# Patient Record
Sex: MALE | Race: WHITE | URBAN - METROPOLITAN AREA
[De-identification: names, ages, dates, MRNs, and addresses within clinical notes are randomized per-mention and may not be internally consistent; named-entity substitution may affect disease eponyms.]

---

## 2016-02-02 LAB — DIABETIC RETINOPATHY: NORMAL

## 2017-03-03 LAB
ALBUMIN SERPL-MCNC: 4.4 G/DL
ALP BLD-CCNC: 62 U/L
ALT SERPL-CCNC: 17 U/L
ANION GAP SERPL CALCULATED.3IONS-SCNC: NORMAL MMOL/L
AST SERPL-CCNC: 19 U/L
BILIRUB SERPL-MCNC: 0.6 MG/DL (ref 0.1–1.4)
BUN BLDV-MCNC: 9 MG/DL
CALCIUM SERPL-MCNC: 9.4 MG/DL
CHLORIDE BLD-SCNC: 103 MMOL/L
CHOLESTEROL, TOTAL: 156 MG/DL
CHOLESTEROL/HDL RATIO: NORMAL
CO2: 26 MMOL/L
CREAT SERPL-MCNC: 1 MG/DL
CREATININE, URINE: 87.9
GFR CALCULATED: NORMAL
GLUCOSE BLD-MCNC: 74 MG/DL
HDLC SERPL-MCNC: 49 MG/DL (ref 35–70)
LDL CHOLESTEROL CALCULATED: 96 MG/DL (ref 0–160)
MICROALBUMIN/CREAT 24H UR: <3 MG/G{CREAT}
MICROALBUMIN/CREAT UR-RTO: <3.4
POTASSIUM SERPL-SCNC: 4.1 MMOL/L
SODIUM BLD-SCNC: 142 MMOL/L
TOTAL PROTEIN: 6.9
TRIGL SERPL-MCNC: 55 MG/DL
VLDLC SERPL CALC-MCNC: NORMAL MG/DL

## 2017-08-02 LAB — T4 FREE: 1.07

## 2017-12-07 DIAGNOSIS — E10.9 TYPE 1 DIABETES MELLITUS WITHOUT COMPLICATION (HCC): ICD-10-CM

## 2017-12-08 ENCOUNTER — OFFICE VISIT (OUTPATIENT)
Dept: ENDOCRINOLOGY | Age: 52
End: 2017-12-08

## 2017-12-08 VITALS
OXYGEN SATURATION: 97 % | HEART RATE: 58 BPM | BODY MASS INDEX: 28.17 KG/M2 | HEIGHT: 71 IN | WEIGHT: 201.2 LBS | DIASTOLIC BLOOD PRESSURE: 82 MMHG | SYSTOLIC BLOOD PRESSURE: 144 MMHG

## 2017-12-08 DIAGNOSIS — Z46.81 INSULIN PUMP TITRATION: ICD-10-CM

## 2017-12-08 DIAGNOSIS — E10.42 CONTROLLED TYPE 1 DIABETES MELLITUS WITH DIABETIC POLYNEUROPATHY (HCC): ICD-10-CM

## 2017-12-08 DIAGNOSIS — Z96.41 INSULIN PUMP IN PLACE: ICD-10-CM

## 2017-12-08 DIAGNOSIS — E10.649 HYPOGLYCEMIC UNAWARENESS ASSOCIATED WITH TYPE 1 DIABETES MELLITUS: Primary | ICD-10-CM

## 2017-12-08 DIAGNOSIS — R79.89 HIGH SERUM THYROID STIMULATING HORMONE (TSH): ICD-10-CM

## 2017-12-08 DIAGNOSIS — E10.69 TYPE 1 DIABETES MELLITUS WITH HYPERCHOLESTEROLEMIA (HCC): ICD-10-CM

## 2017-12-08 DIAGNOSIS — E78.00 TYPE 1 DIABETES MELLITUS WITH HYPERCHOLESTEROLEMIA (HCC): ICD-10-CM

## 2017-12-08 PROCEDURE — 99201 PR OFFICE OUTPATIENT NEW 10 MINUTES: CPT | Performed by: INTERNAL MEDICINE

## 2017-12-08 ASSESSMENT — PATIENT HEALTH QUESTIONNAIRE - PHQ9
1. LITTLE INTEREST OR PLEASURE IN DOING THINGS: 0
SUM OF ALL RESPONSES TO PHQ QUESTIONS 1-9: 0
SUM OF ALL RESPONSES TO PHQ9 QUESTIONS 1 & 2: 0
2. FEELING DOWN, DEPRESSED OR HOPELESS: 0

## 2017-12-08 NOTE — PATIENT INSTRUCTIONS

## 2017-12-08 NOTE — PROGRESS NOTES
11\" (1.803 m)   Wt 201 lb 3.2 oz (91.3 kg)   SpO2 97%   BMI 28.06 kg/m²   Constitutional: Patient is oriented to person, place, and time. Patient appears well-developed and well-nourished. HENT:    Head: Normocephalic and atraumatic. Eyes: Conjunctivae and EOM are normal. Pupils are equal, round, and reactive to light. Neck: Normal range of motion. Cardiovascular: Normal rate, regular rhythm and normal heart sounds. Pulmonary/Chest: Effort normal and breath sounds normal.   Abdominal: Soft. Bowel sounds are normal.   Musculoskeletal: Normal range of motion. Neurological: Patient is alert and oriented to person, place, and time. Patient has normal reflexes. Skin: Skin is warm and dry. Psychiatric: Patient has a normal mood and affect. Patient behavior is normal.     Lab Review:    Abstract on 12/07/2017   Component Date Value Ref Range Status    T4 Free 08/02/2017 1.07   Final    Microalb Creat Ratio 03/03/2017 <3.4   Final    Microalb, Ur 03/03/2017 <3.0   Final    Creatinine, Urine 03/03/2017 87.9   Final    Cholesterol, Total 03/03/2017 156  mg/dL Final    HDL 03/03/2017 49  35 - 70 mg/dL Final    LDL Calculated 03/03/2017 96  0 - 160 mg/dL Final    Triglycerides 03/03/2017 55  mg/dL Final    Sodium 03/03/2017 142  mmol/L Final    Chloride 03/03/2017 103  mmol/L Final    Potassium 03/03/2017 4.1  mmol/L Final    BUN 03/03/2017 9  mg/dL Final    CREATININE 03/03/2017 1.00   Final    Glucose 03/03/2017 74  mg/dL Final    AST 03/03/2017 19  U/L Final    ALT 03/03/2017 17  U/L Final    Calcium 03/03/2017 9.4  mg/dL Final    Total Protein 03/03/2017 6.9   Final    CO2 03/03/2017 26  mmol/L Final    Alb 03/03/2017 4.4   Final    Alkaline Phosphatase 03/03/2017 62  U/L Final    Total Bilirubin 03/03/2017 0.6  0.1 - 1.4 mg/dL Final           Assessment and Plan     Ammon Franco was seen today for diabetes.     Diagnoses and all orders for this visit:    Hypoglycemic unawareness associated with type 1 diabetes mellitus (HCC)  -     Microalbumin / Creatinine Urine Ratio; Future  -     Hemoglobin A1C; Future  -     Comprehensive Metabolic Panel; Future  -     glucose blood VI test strips (ANDREE CONTOUR NEXT TEST) strip; Check blood sugars 8-10 times per day/    Insulin pump in place    Insulin pump titration    Controlled type 1 diabetes mellitus with diabetic polyneuropathy ([facility] [address]  -     Lipid Panel; Future  -     CBC Auto Differential; Future  -     glucose blood VI test strips (ANDREE CONTOUR NEXT TEST) strip; Check blood sugars 8-10 times per day/    Type 1 diabetes mellitus with hypercholesterolemia (HCC)    High serum thyroid stimulating hormone (TSH)  -     T4, Free; Future  -     TSH without Reflex; Future  -     Thyroid Peroxidase Antibody; Future  -     Anti-Thyroglobulin Antibody; Future      1: Type 1 DM complicated with hypoglycemia and neuropathy   Controlled A1C 5.2% July 2017   CMP normal     Pump settings   Rule of 15 for hypoglycemic episodes discussed   I showed how to use temporary basal settings     Keep changing pump site every 3-4 days   Pump downloads are reviewed, discussed with the patient and sent for scanning   Always use bolus wizards    Basal rates: Mn: 0.8--> 0.775  6am 1.25 --> 1.15   Noon: 0.825    Correction 30   I:CHO 1:12   Target 100-150   Active insulin time: 4 hours    All instructions provided in written. Check Blood sugars 3-4 times per day. Log them along with insulin and send them every 2 weeks. Call for blood sugars less than 60 or more than 400. Eye exam: Last exam in spring 2017. Reportedly early glaucoma.    Foot exam: Dec 2017   Deformity/amputation: absent  Skin lesions/pre-ulcerative calluses: absent  Edema: right- negative, left- negative    Sensory exam: Monofilament sensation: normal  Plus at least one of the following:   Pulses: 1 plus   Vibration (128 Hz):slightly decreased bilaterally     Renal screen: Normal March 2017     RD seen

## 2018-03-05 ENCOUNTER — TELEPHONE (OUTPATIENT)
Dept: ENDOCRINOLOGY | Age: 53
End: 2018-03-05

## 2018-03-05 NOTE — TELEPHONE ENCOUNTER
Called [facility] pharmacy back and was left on hold too long. Will try again later.

## 2018-03-05 NOTE — TELEPHONE ENCOUNTER
Called [facility] and spoke with Sam Check the pharmacist. Gave diagnosis code E 11.9

## 2018-03-07 ENCOUNTER — TELEPHONE (OUTPATIENT)
Dept: ENDOCRINOLOGY | Age: 53
End: 2018-03-07

## 2018-03-08 DIAGNOSIS — E10.649 HYPOGLYCEMIC UNAWARENESS ASSOCIATED WITH TYPE 1 DIABETES MELLITUS: ICD-10-CM

## 2018-03-08 DIAGNOSIS — E10.42 CONTROLLED TYPE 1 DIABETES MELLITUS WITH DIABETIC POLYNEUROPATHY (HCC): ICD-10-CM

## 2018-03-08 DIAGNOSIS — R79.89 HIGH SERUM THYROID STIMULATING HORMONE (TSH): ICD-10-CM

## 2018-03-08 LAB
A/G RATIO: 2.3 (ref 1.1–2.2)
ALBUMIN SERPL-MCNC: 4.5 G/DL (ref 3.4–5)
ALP BLD-CCNC: 71 U/L (ref 40–129)
ALT SERPL-CCNC: 11 U/L (ref 10–40)
ANION GAP SERPL CALCULATED.3IONS-SCNC: 13 MMOL/L (ref 3–16)
ANTI-THYROGLOB ABS: <10 IU/ML
AST SERPL-CCNC: 14 U/L (ref 15–37)
BASOPHILS ABSOLUTE: 0 K/UL (ref 0–0.2)
BASOPHILS RELATIVE PERCENT: 0.7 %
BILIRUB SERPL-MCNC: 0.8 MG/DL (ref 0–1)
BUN BLDV-MCNC: 10 MG/DL (ref 7–20)
CALCIUM SERPL-MCNC: 9.1 MG/DL (ref 8.3–10.6)
CHLORIDE BLD-SCNC: 104 MMOL/L (ref 99–110)
CHOLESTEROL, TOTAL: 172 MG/DL (ref 0–199)
CO2: 26 MMOL/L (ref 21–32)
CREAT SERPL-MCNC: 0.9 MG/DL (ref 0.9–1.3)
CREATININE URINE: 169.9 MG/DL (ref 39–259)
EOSINOPHILS ABSOLUTE: 0.1 K/UL (ref 0–0.6)
EOSINOPHILS RELATIVE PERCENT: 2.3 %
GFR AFRICAN AMERICAN: >60
GFR NON-AFRICAN AMERICAN: >60
GLOBULIN: 2 G/DL
GLUCOSE BLD-MCNC: 147 MG/DL (ref 70–99)
HCT VFR BLD CALC: 42 % (ref 40.5–52.5)
HDLC SERPL-MCNC: 46 MG/DL (ref 40–60)
HEMOGLOBIN: 14.1 G/DL (ref 13.5–17.5)
LDL CHOLESTEROL CALCULATED: 112 MG/DL
LYMPHOCYTES ABSOLUTE: 1.2 K/UL (ref 1–5.1)
LYMPHOCYTES RELATIVE PERCENT: 24.2 %
MCH RBC QN AUTO: 29.4 PG (ref 26–34)
MCHC RBC AUTO-ENTMCNC: 33.7 G/DL (ref 31–36)
MCV RBC AUTO: 87.4 FL (ref 80–100)
MICROALBUMIN UR-MCNC: <1.2 MG/DL
MICROALBUMIN/CREAT UR-RTO: NORMAL MG/G (ref 0–30)
MONOCYTES ABSOLUTE: 0.3 K/UL (ref 0–1.3)
MONOCYTES RELATIVE PERCENT: 6.7 %
NEUTROPHILS ABSOLUTE: 3.4 K/UL (ref 1.7–7.7)
NEUTROPHILS RELATIVE PERCENT: 66.1 %
PDW BLD-RTO: 14.4 % (ref 12.4–15.4)
PLATELET # BLD: 266 K/UL (ref 135–450)
PMV BLD AUTO: 9.5 FL (ref 5–10.5)
POTASSIUM SERPL-SCNC: 4.3 MMOL/L (ref 3.5–5.1)
RBC # BLD: 4.81 M/UL (ref 4.2–5.9)
SODIUM BLD-SCNC: 143 MMOL/L (ref 136–145)
T4 FREE: 1.1 NG/DL (ref 0.9–1.8)
THYROID PEROXIDASE (TPO) ABS: 6 IU/ML
TOTAL PROTEIN: 6.5 G/DL (ref 6.4–8.2)
TRIGL SERPL-MCNC: 68 MG/DL (ref 0–150)
TSH SERPL DL<=0.05 MIU/L-ACNC: 3.1 UIU/ML (ref 0.27–4.2)
VLDLC SERPL CALC-MCNC: 14 MG/DL
WBC # BLD: 5.2 K/UL (ref 4–11)

## 2018-03-09 LAB
ESTIMATED AVERAGE GLUCOSE: 142.7 MG/DL
HBA1C MFR BLD: 6.6 %

## 2018-03-14 ENCOUNTER — OFFICE VISIT (OUTPATIENT)
Dept: ENDOCRINOLOGY | Age: 53
End: 2018-03-14

## 2018-03-14 VITALS
OXYGEN SATURATION: 99 % | DIASTOLIC BLOOD PRESSURE: 80 MMHG | BODY MASS INDEX: 26.32 KG/M2 | SYSTOLIC BLOOD PRESSURE: 133 MMHG | WEIGHT: 188 LBS | HEIGHT: 71 IN | HEART RATE: 60 BPM

## 2018-03-14 DIAGNOSIS — E10.42 CONTROLLED TYPE 1 DIABETES MELLITUS WITH DIABETIC POLYNEUROPATHY (HCC): ICD-10-CM

## 2018-03-14 DIAGNOSIS — Z46.81 INSULIN PUMP TITRATION: ICD-10-CM

## 2018-03-14 DIAGNOSIS — E78.00 TYPE 1 DIABETES MELLITUS WITH HYPERCHOLESTEROLEMIA (HCC): Primary | ICD-10-CM

## 2018-03-14 DIAGNOSIS — E10.69 TYPE 1 DIABETES MELLITUS WITH HYPERCHOLESTEROLEMIA (HCC): Primary | ICD-10-CM

## 2018-03-14 DIAGNOSIS — E10.649 HYPOGLYCEMIC UNAWARENESS ASSOCIATED WITH TYPE 1 DIABETES MELLITUS: ICD-10-CM

## 2018-03-14 DIAGNOSIS — Z96.41 INSULIN PUMP IN PLACE: ICD-10-CM

## 2018-03-14 PROCEDURE — 99215 OFFICE O/P EST HI 40 MIN: CPT | Performed by: INTERNAL MEDICINE

## 2018-03-14 RX ORDER — ATORVASTATIN CALCIUM 20 MG/1
20 TABLET, FILM COATED ORAL DAILY
Qty: 90 TABLET | Refills: 1 | Status: SHIPPED | OUTPATIENT
Start: 2018-03-14 | End: 2019-03-08 | Stop reason: SDUPTHER

## 2018-03-14 ASSESSMENT — PATIENT HEALTH QUESTIONNAIRE - PHQ9
2. FEELING DOWN, DEPRESSED OR HOPELESS: 0
SUM OF ALL RESPONSES TO PHQ9 QUESTIONS 1 & 2: 0
1. LITTLE INTEREST OR PLEASURE IN DOING THINGS: 0
SUM OF ALL RESPONSES TO PHQ QUESTIONS 1-9: 0

## 2018-03-14 NOTE — PROGRESS NOTES
Patient ID:   Jenny Sherman is a 45 y.o. male    Chief Complaint:   Jenny Sherman presents for an evaluation of Type 1 Diabetes Mellitus , Hyperlipidemia and hypertension. Here with wife   Subjective:   Type 1 Diabetes Mellitus diagnosed in 1968. Counts carbs, comfortable counting     Recently doing home repair     Medtronic 670G   Uses sensor     Basal: units per hour   Mn: 0.750  6am 1.30   Noon: 0.70    Correction 30   I:CHO MN: 1:12, 5pm: 1:11   Target 100-150   Active insulin time: 4 hours    Checks blood sugars 8-10 times per day. Reviewed and scanned. AM:   Lunch:  Supper:   HS:     Hypoglycemias: 1 ever 2 weeks. Hypoglycemic unawareness: mild awareness in 60's. Glucagon used last week at night. Low blood sugars usually at night. Meals: Three, dinner is bigger. 3-4 snacks. Exercise: 15-20 minutes a day, treadmill walking     Denies chest pain, exertional dyspnea. Family history of CAD: None   Denies smoking/ alcohol. Currently on ASA 81 mg daily     The following portions of the patient's history were reviewed and updated as appropriate:       Family History   Problem Relation Age of Onset   Nael Glaser Breast Cancer Mother     Diabetes Maternal Uncle      type 1    Cancer Paternal Grandmother     Cancer Paternal Grandfather          Social History     Social History    Marital status: Single     Spouse name: N/A    Number of children: N/A    Years of education: N/A     Occupational History    Not on file.      Social History Main Topics    Smoking status: Never Smoker    Smokeless tobacco: Never Used    Alcohol use No    Drug use: No    Sexual activity: Not on file     Other Topics Concern    Not on file     Social History Narrative    No narrative on file       Past Medical History:   Diagnosis Date    Diabetes mellitus ([address]     Insulin pump in place        Past Surgical History:   Procedure Laterality Date    UPPER GASTROINTESTINAL ENDOSCOPY         No Known easily. Psychiatric/Behavioral: Negative for suicidal ideas, depression, anxiety, sleep disturbance and decreased concentration. Objective:   Physical Exam:  /80 (Site: Left Arm, Position: Sitting, Cuff Size: Large Adult)   Pulse 60   Ht 5' 11\" (1.803 m)   Wt 188 lb (85.3 kg)   SpO2 99%   BMI 26.22 kg/m²   Constitutional: Patient is oriented to person, place, and time. Patient appears well-developed and well-nourished. HENT:    Head: Normocephalic and atraumatic. Eyes: Conjunctivae and EOM are normal. Pupils are equal, round, and reactive to light. Neck: Normal range of motion. Cardiovascular: Normal rate, regular rhythm and normal heart sounds. Pulmonary/Chest: Effort normal and breath sounds normal.   Abdominal: Soft. Bowel sounds are normal.   Musculoskeletal: Normal range of motion. Neurological: Patient is alert and oriented to person, place, and time. Patient has normal reflexes. Skin: Skin is warm and dry. Psychiatric: Patient has a normal mood and affect.  Patient behavior is normal.     Lab Review:    Office Visit on 03/14/2018   Component Date Value Ref Range Status    Diabetic Retinopathy 02/02/2016 NO DR   Final   Orders Only on 03/08/2018   Component Date Value Ref Range Status    Microalbumin, Random Urine 03/08/2018 <1.20  <2.0 mg/dL Final    Creatinine, Ur 03/08/2018 169.9  39.0 - 259.0 mg/dL Final    Microalbumin Creatinine Ratio 03/08/2018 see below  0.0 - 30.0 mg/g Final    Hemoglobin A1C 03/08/2018 6.6  See comment % Final    eAG 03/08/2018 142.7  mg/dL Final    Cholesterol, Total 03/08/2018 172  0 - 199 mg/dL Final    Triglycerides 03/08/2018 68  0 - 150 mg/dL Final    HDL 03/08/2018 46  40 - 60 mg/dL Final    LDL Calculated 03/08/2018 112* <100 mg/dL Final    VLDL Cholesterol Calculated 03/08/2018 14  Not Established mg/dL Final    Sodium 03/08/2018 143  136 - 145 mmol/L Final    Potassium 03/08/2018 4.3  3.5 - 5.1 mmol/L Final    Chloride  Anti-Thyroglob Abs 03/08/2018 <10  <115 IU/mL Final   Abstract on 12/07/2017   Component Date Value Ref Range Status    T4 Free 08/02/2017 1.07   Final    Microalbumin Creatinine Ratio 03/03/2017 <3.4   Final    Microalb, Ur 03/03/2017 <3.0   Final    Creatinine, Urine 03/03/2017 87.9   Final    Cholesterol, Total 03/03/2017 156  mg/dL Final    HDL 03/03/2017 49  35 - 70 mg/dL Final    LDL Calculated 03/03/2017 96  0 - 160 mg/dL Final    Triglycerides 03/03/2017 55  mg/dL Final    Sodium 03/03/2017 142  mmol/L Final    Chloride 03/03/2017 103  mmol/L Final    Potassium 03/03/2017 4.1  mmol/L Final    BUN 03/03/2017 9  mg/dL Final    CREATININE 03/03/2017 1.00   Final    Glucose 03/03/2017 74  mg/dL Final    AST 03/03/2017 19  U/L Final    ALT 03/03/2017 17  U/L Final    Calcium 03/03/2017 9.4  mg/dL Final    Total Protein 03/03/2017 6.9   Final    CO2 03/03/2017 26  mmol/L Final    Alb 03/03/2017 4.4   Final    Alkaline Phosphatase 03/03/2017 62  U/L Final    Total Bilirubin 03/03/2017 0.6  0.1 - 1.4 mg/dL Final           Assessment and Plan     Virgiloi Brain was seen today for diabetes. Diagnoses and all orders for this visit:    Type 1 diabetes mellitus with hypercholesterolemia (HCC)  -     insulin glargine (LANTUS SOLOSTAR) 100 UNIT/ML injection pen; Inject 30 Units into the skin nightly In case of pump malfunction  -     glucose blood VI test strips (ANDREE CONTOUR NEXT TEST) strip; 8-9 each by In Vitro route daily As needed. DX CODE: E11.9  -     atorvastatin (LIPITOR) 20 MG tablet; Take 1 tablet by mouth daily  -     Hemoglobin A1C; Future  -     Lipid Panel; Future  -     insulin aspart (NOVOLOG) 100 UNIT/ML injection vial; For insulin pump. Upto 80 units per day.   -     insulin lispro (HUMALOG) 100 UNIT/ML injection vial; Use as directed   Lot number [id number]  EX 9/2019    Hypoglycemic unawareness associated with type 1 diabetes mellitus ([facility]    Controlled type 1 diabetes mellitus with diabetic polyneuropathy (HCC)  -     insulin glargine (LANTUS SOLOSTAR) 100 UNIT/ML injection pen; Inject 30 Units into the skin nightly In case of pump malfunction  -     glucose blood VI test strips (ANDREE CONTOUR NEXT TEST) strip; 8-9 each by In Vitro route daily As needed. DX CODE: E11.9  -     insulin lispro (HUMALOG) 100 UNIT/ML injection vial; Use as directed   Lot number [id number]  EX 9/2019    Insulin pump in place    Insulin pump titration      1: Type 1 DM complicated with hypoglycemia and neuropathy   Controlled A1C 6.6% Dec 2017   CMP normal       Hypoglycemias during the day are due to activity   Asked him to use temp basals     Pump settings   Rule of 15 for hypoglycemic episodes discussed   I showed how to use temporary basal settings     Keep changing pump site every 3-4 days   Pump downloads are reviewed, discussed with the patient and sent for scanning   Always use bolus wizards    Basal rates: Mn: 0.750  6am 1.30   Noon: 0.70    Correction 30   I:CHO MN: 1:10    Target 100-150   Active insulin time: 4 hours    All instructions provided in written. Check Blood sugars 3-4 times per day. Log them along with insulin and send them every 2 weeks. Call for blood sugars less than 60 or more than 400. Eye exam: Last exam in spring 2017. Reportedly early glaucoma. Due for exam.   Foot exam: Dec 2017   Deformity/amputation: absent  Skin lesions/pre-ulcerative calluses: absent  Edema: right- negative, left- negative    Sensory exam: Monofilament sensation: normal  Plus at least one of the following:   Pulses: 1 plus   Vibration (128 Hz):slightly decreased bilaterally     Renal screen: Normal March 2018    RD seen in 2017.    TSH screen: normal with negative abs March 2018     2: HTN   Controlled     3: Hyperlipidemia   LDL: 112 , HDL: 46 , TGs: 46 March 2018    Statins indicated   Atorvastatin 20mg daily at bedtime   Side effects discussed including muscle cramps     4: Weight loss  13 lbs in last 3 months and 20 lbs since Aug 2017    Recommend seeing pcp     A1C, lipids in 3 months       The total time spent face to face for this encounter was 45 minutes. Greater than 50% of the time was spent in counseling and coordination of care, treatment goals, benefits and risks of the treatment for diabetes, HTN, insulin pump, benefits of statins. EDUCATION:   Greater than 50% of this follow-up visit was spent in general counseling regarding   obesity, diet, exercise, importance of adherence to insulin regime, recognition and treatment of hypo and hyperglycemia,  glucose logging, proper diabetes management, diabetic complications with poor management and the importance of glycemic control in order to avoid the complications of diabetes. Risks and potential complications of diabetes were reviewed with the patient. Diabetes health maintenance plan and follow-up were discussed and understood by the patient. We reviewed the importance of medication compliance and regular follow-up. Aggressive lifestyle modification was encouraged. Exercise Counselling: This patient is a candidate for regular physical exercise. Instructions to perform the following types of exercise:  Swimming or water aerobic exercise  Brisk walking  Playing tennis  Stationary bicycle or elliptical indoor  Low impact aerobic exercise    Instructions given to exercise for the following duration:  30 minutes a day for five-seven days per week.     Following instructions for being active throughout the day in addition to formal exercise:  Walk instead of drive whenever possible  Take the stairs instead of the elevator  Work in the garden  Park to the far end of the parking lot to add more walking steps to destination      Electronically signed by Tim Zaman MD on 3/15/2018 at 10:03 AM

## 2018-03-21 ENCOUNTER — TELEPHONE (OUTPATIENT)
Dept: ENDOCRINOLOGY | Age: 53
End: 2018-03-21

## 2018-03-21 DIAGNOSIS — E10.42 CONTROLLED TYPE 1 DIABETES MELLITUS WITH DIABETIC POLYNEUROPATHY (HCC): Primary | ICD-10-CM

## 2018-03-21 NOTE — TELEPHONE ENCOUNTER
PA submitted for Lantus soslstar in the even of pump malfunction as been denied as the request did not meet plan criteria for an exception to the preferred drug on the formulary such as Toujeo or Winda Cure. Please submit RX for one of the alternatives noted.

## 2018-03-22 ENCOUNTER — TELEPHONE (OUTPATIENT)
Dept: ENDOCRINOLOGY | Age: 53
End: 2018-03-22

## 2018-03-30 LAB — DIABETIC RETINOPATHY: NORMAL

## 2018-04-18 ENCOUNTER — TELEPHONE (OUTPATIENT)
Dept: ENDOCRINOLOGY | Age: 53
End: 2018-04-18

## 2018-04-19 ENCOUNTER — TELEPHONE (OUTPATIENT)
Dept: ENDOCRINOLOGY | Age: 53
End: 2018-04-19

## 2018-05-07 ENCOUNTER — TELEPHONE (OUTPATIENT)
Dept: ENDOCRINOLOGY | Age: 53
End: 2018-05-07

## 2018-05-07 DIAGNOSIS — E10.42 TYPE 1 DIABETES MELLITUS WITH POLYNEUROPATHY (HCC): Primary | ICD-10-CM

## 2018-05-07 RX ORDER — BLOOD-GLUCOSE METER
1 KIT MISCELLANEOUS DAILY PRN
Qty: 1 DEVICE | Refills: 1 | Status: SHIPPED | OUTPATIENT
Start: 2018-05-07 | End: 2018-11-28 | Stop reason: ALTCHOICE

## 2018-07-27 DIAGNOSIS — E78.00 TYPE 1 DIABETES MELLITUS WITH HYPERCHOLESTEROLEMIA (HCC): ICD-10-CM

## 2018-07-27 DIAGNOSIS — E10.69 TYPE 1 DIABETES MELLITUS WITH HYPERCHOLESTEROLEMIA (HCC): ICD-10-CM

## 2018-07-27 LAB
CHOLESTEROL, TOTAL: 153 MG/DL (ref 0–199)
HDLC SERPL-MCNC: 52 MG/DL (ref 40–60)
LDL CHOLESTEROL CALCULATED: 93 MG/DL
TRIGL SERPL-MCNC: 41 MG/DL (ref 0–150)
VLDLC SERPL CALC-MCNC: 8 MG/DL

## 2018-07-28 LAB
ESTIMATED AVERAGE GLUCOSE: 131.2 MG/DL
HBA1C MFR BLD: 6.2 %

## 2018-08-03 ENCOUNTER — OFFICE VISIT (OUTPATIENT)
Dept: ENDOCRINOLOGY | Age: 53
End: 2018-08-03

## 2018-08-03 VITALS
SYSTOLIC BLOOD PRESSURE: 142 MMHG | DIASTOLIC BLOOD PRESSURE: 77 MMHG | HEART RATE: 53 BPM | HEIGHT: 71 IN | WEIGHT: 183 LBS | BODY MASS INDEX: 25.62 KG/M2 | OXYGEN SATURATION: 98 %

## 2018-08-03 DIAGNOSIS — E10.649 HYPOGLYCEMIC UNAWARENESS ASSOCIATED WITH TYPE 1 DIABETES MELLITUS: ICD-10-CM

## 2018-08-03 DIAGNOSIS — Z96.41 INSULIN PUMP IN PLACE: ICD-10-CM

## 2018-08-03 DIAGNOSIS — E10.3299 TYPE 1 DIABETES MELLITUS WITH BACKGROUND DIABETIC RETINOPATHY (HCC): ICD-10-CM

## 2018-08-03 DIAGNOSIS — E10.69 TYPE 1 DIABETES MELLITUS WITH HYPERCHOLESTEROLEMIA (HCC): Primary | ICD-10-CM

## 2018-08-03 DIAGNOSIS — E78.00 TYPE 1 DIABETES MELLITUS WITH HYPERCHOLESTEROLEMIA (HCC): Primary | ICD-10-CM

## 2018-08-03 DIAGNOSIS — E10.42 CONTROLLED TYPE 1 DIABETES MELLITUS WITH DIABETIC POLYNEUROPATHY (HCC): ICD-10-CM

## 2018-08-03 DIAGNOSIS — Z46.81 INSULIN PUMP TITRATION: ICD-10-CM

## 2018-08-03 PROCEDURE — 99215 OFFICE O/P EST HI 40 MIN: CPT | Performed by: INTERNAL MEDICINE

## 2018-08-03 ASSESSMENT — PATIENT HEALTH QUESTIONNAIRE - PHQ9
SUM OF ALL RESPONSES TO PHQ9 QUESTIONS 1 & 2: 0
2. FEELING DOWN, DEPRESSED OR HOPELESS: 0
1. LITTLE INTEREST OR PLEASURE IN DOING THINGS: 0
SUM OF ALL RESPONSES TO PHQ QUESTIONS 1-9: 0

## 2018-08-03 NOTE — PROGRESS NOTES
Patient ID:   Sid Leonardo is a 45 y.o. male    Chief Complaint:   Sid Leonardo presents for an evaluation of Type 1 Diabetes Mellitus , Hyperlipidemia and hypertension. Here with wife   Subjective:   Type 1 Diabetes Mellitus diagnosed in 1968. Counts carbs, comfortable counting       Medtronic 670G   Uses sensor     Basal: units per hour   Mn: 0.750  6am 1.30   Noon: 0.70    Correction 30   I:CHO MN: 1:10  Target    Active insulin time: 4 hours    Checks blood sugars 8-10 times per day. Reviewed and scanned. AM:   Lunch:  Supper:   HS:     Hypoglycemias: In the evenings, one a week numbers less than 40. Once had to use Glucagon in June 2018. Hypoglycemic unawareness: mild awareness in 60's. Glucagon used last week at night. Low blood sugars usually at night. Meals: Three, dinner is bigger. 3-4 snacks. Exercise: No treadmill. 5-20 minutes a day, treadmill walking     Denies chest pain, exertional dyspnea. Family history of CAD: None   Denies smoking/ alcohol. Currently on ASA 81 mg daily     The following portions of the patient's history were reviewed and updated as appropriate:       Family History   Problem Relation Age of Onset   Adrián Garcia Breast Cancer Mother     Diabetes Maternal Uncle         type 1    Cancer Paternal Grandmother     Cancer Paternal Grandfather          Social History     Social History    Marital status: Single     Spouse name: N/A    Number of children: N/A    Years of education: N/A     Occupational History    Not on file.      Social History Main Topics    Smoking status: Never Smoker    Smokeless tobacco: Never Used    Alcohol use No    Drug use: No    Sexual activity: Not on file     Other Topics Concern    Not on file     Social History Narrative    No narrative on file       Past Medical History:   Diagnosis Date    Diabetes mellitus ([address]     Insulin pump in place        Past Surgical History:   Procedure Laterality Date    UPPER Large Adult)   Pulse 53   Ht 5' 11\" (1.803 m)   Wt 183 lb (83 kg)   SpO2 98%   BMI 25.52 kg/m²   Constitutional: Patient is oriented to person, place, and time. Patient appears well-developed and well-nourished. HENT:    Head: Normocephalic and atraumatic. Eyes: Conjunctivae and EOM are normal. Pupils are equal, round, and reactive to light. Neck: Normal range of motion. Cardiovascular: Normal rate, regular rhythm and normal heart sounds. Pulmonary/Chest: Effort normal and breath sounds normal.   Abdominal: Soft. Bowel sounds are normal.   Musculoskeletal: Normal range of motion. Neurological: Patient is alert and oriented to person, place, and time. Patient has normal reflexes. Skin: Skin is warm and dry. Psychiatric: Patient has a normal mood and affect.  Patient behavior is normal.     Lab Review:    Orders Only on 07/27/2018   Component Date Value Ref Range Status    Hemoglobin A1C 07/27/2018 6.2  See comment % Final    eAG 07/27/2018 131.2  mg/dL Final    Cholesterol, Total 07/27/2018 153  0 - 199 mg/dL Final    Triglycerides 07/27/2018 41  0 - 150 mg/dL Final    HDL 07/27/2018 52  40 - 60 mg/dL Final    LDL Calculated 07/27/2018 93  <100 mg/dL Final    VLDL Cholesterol Calculated 07/27/2018 8  Not Established mg/dL Final   Office Visit on 03/14/2018   Component Date Value Ref Range Status    Diabetic Retinopathy 02/02/2016 NO DR   Final   Orders Only on 03/08/2018   Component Date Value Ref Range Status    Microalbumin, Random Urine 03/08/2018 <1.20  <2.0 mg/dL Final    Creatinine, Ur 03/08/2018 169.9  39.0 - 259.0 mg/dL Final    Microalbumin Creatinine Ratio 03/08/2018 see below  0.0 - 30.0 mg/g Final    Hemoglobin A1C 03/08/2018 6.6  See comment % Final    eAG 03/08/2018 142.7  mg/dL Final    Cholesterol, Total 03/08/2018 172  0 - 199 mg/dL Final    Triglycerides 03/08/2018 68  0 - 150 mg/dL Final    HDL 03/08/2018 46  40 - 60 mg/dL Final    LDL Calculated 03/08/2018 Basophils # 03/08/2018 0.0  0.0 - 0.2 K/uL Final    T4 Free 03/08/2018 1.1  0.9 - 1.8 ng/dL Final    TSH 03/08/2018 3.10  0.27 - 4.20 uIU/mL Final    THYROID PEROXIDASE (TPO) ABS 03/08/2018 6  <34 IU/mL Final    Anti-Thyroglob Abs 03/08/2018 <10  <115 IU/mL Final   Abstract on 12/07/2017   Component Date Value Ref Range Status    T4 Free 08/02/2017 1.07   Final    Microalbumin Creatinine Ratio 03/03/2017 <3.4   Final    Microalb, Ur 03/03/2017 <3.0   Final    Creatinine, Urine 03/03/2017 87.9   Final    Cholesterol, Total 03/03/2017 156  mg/dL Final    HDL 03/03/2017 49  35 - 70 mg/dL Final    LDL Calculated 03/03/2017 96  0 - 160 mg/dL Final    Triglycerides 03/03/2017 55  mg/dL Final    Sodium 03/03/2017 142  mmol/L Final    Chloride 03/03/2017 103  mmol/L Final    Potassium 03/03/2017 4.1  mmol/L Final    BUN 03/03/2017 9  mg/dL Final    CREATININE 03/03/2017 1.00   Final    Glucose 03/03/2017 74  mg/dL Final    AST 03/03/2017 19  U/L Final    ALT 03/03/2017 17  U/L Final    Calcium 03/03/2017 9.4  mg/dL Final    Total Protein 03/03/2017 6.9   Final    CO2 03/03/2017 26  mmol/L Final    Alb 03/03/2017 4.4   Final    Alkaline Phosphatase 03/03/2017 62  U/L Final    Total Bilirubin 03/03/2017 0.6  0.1 - 1.4 mg/dL Final           Assessment and Plan     Trudie Habermann was seen today for diabetes. Diagnoses and all orders for this visit:    Type 1 diabetes mellitus with hypercholesterolemia (HCC)  -     glucagon (GLUCAGON EMERGENCY) 1 MG injection; Infuse 1 mg intravenously once for 1 dose  -     Hemoglobin A1C; Future    Hypoglycemic unawareness associated with type 1 diabetes mellitus (HCC)  -     glucagon (GLUCAGON EMERGENCY) 1 MG injection;  Infuse 1 mg intravenously once for 1 dose  -     Hemoglobin A1C; Future    Controlled type 1 diabetes mellitus with diabetic polyneuropathy (HCC)  -     Hemoglobin A1C; Future    Insulin pump titration    Insulin pump in place    Type 1 diabetes mellitus of adherence to insulin regime, recognition and treatment of hypo and hyperglycemia,  glucose logging, proper diabetes management, diabetic complications with poor management and the importance of glycemic control in order to avoid the complications of diabetes. Risks and potential complications of diabetes were reviewed with the patient. Diabetes health maintenance plan and follow-up were discussed and understood by the patient. We reviewed the importance of medication compliance and regular follow-up. Aggressive lifestyle modification was encouraged. Exercise Counselling: This patient is a candidate for regular physical exercise. Instructions to perform the following types of exercise:  Swimming or water aerobic exercise  Brisk walking  Playing tennis  Stationary bicycle or elliptical indoor  Low impact aerobic exercise    Instructions given to exercise for the following duration:  30 minutes a day for five-seven days per week.     Following instructions for being active throughout the day in addition to formal exercise:  Walk instead of drive whenever possible  Take the stairs instead of the elevator  Work in the garden  Park to the far end of the parking lot to add more walking steps to destination      Electronically signed by Archie Gutierres MD on 8/3/2018 at 12:34 PM

## 2018-11-27 DIAGNOSIS — E10.42 TYPE 1 DIABETES MELLITUS WITH POLYNEUROPATHY (HCC): ICD-10-CM

## 2018-11-28 ENCOUNTER — OFFICE VISIT (OUTPATIENT)
Dept: ENDOCRINOLOGY | Age: 53
End: 2018-11-28
Payer: COMMERCIAL

## 2018-11-28 VITALS
WEIGHT: 187 LBS | SYSTOLIC BLOOD PRESSURE: 134 MMHG | BODY MASS INDEX: 26.18 KG/M2 | HEART RATE: 50 BPM | DIASTOLIC BLOOD PRESSURE: 78 MMHG | OXYGEN SATURATION: 100 % | HEIGHT: 71 IN

## 2018-11-28 DIAGNOSIS — E10.42 CONTROLLED TYPE 1 DIABETES MELLITUS WITH DIABETIC POLYNEUROPATHY (HCC): ICD-10-CM

## 2018-11-28 DIAGNOSIS — E78.00 TYPE 1 DIABETES MELLITUS WITH HYPERCHOLESTEROLEMIA (HCC): ICD-10-CM

## 2018-11-28 DIAGNOSIS — Z96.41 INSULIN PUMP IN PLACE: ICD-10-CM

## 2018-11-28 DIAGNOSIS — E10.649 HYPOGLYCEMIC UNAWARENESS ASSOCIATED WITH TYPE 1 DIABETES MELLITUS: ICD-10-CM

## 2018-11-28 DIAGNOSIS — E10.3299 TYPE 1 DIABETES MELLITUS WITH BACKGROUND DIABETIC RETINOPATHY (HCC): ICD-10-CM

## 2018-11-28 DIAGNOSIS — E10.69 TYPE 1 DIABETES MELLITUS WITH HYPERCHOLESTEROLEMIA (HCC): ICD-10-CM

## 2018-11-28 DIAGNOSIS — E10.42 CONTROLLED TYPE 1 DIABETES MELLITUS WITH DIABETIC POLYNEUROPATHY (HCC): Primary | ICD-10-CM

## 2018-11-28 DIAGNOSIS — Z46.81 INSULIN PUMP TITRATION: ICD-10-CM

## 2018-11-28 PROCEDURE — 99215 OFFICE O/P EST HI 40 MIN: CPT | Performed by: INTERNAL MEDICINE

## 2018-11-28 PROCEDURE — 3044F HG A1C LEVEL LT 7.0%: CPT | Performed by: INTERNAL MEDICINE

## 2018-11-28 PROCEDURE — G8419 CALC BMI OUT NRM PARAM NOF/U: HCPCS | Performed by: INTERNAL MEDICINE

## 2018-11-28 PROCEDURE — 2022F DILAT RTA XM EVC RTNOPTHY: CPT | Performed by: INTERNAL MEDICINE

## 2018-11-28 PROCEDURE — 3017F COLORECTAL CA SCREEN DOC REV: CPT | Performed by: INTERNAL MEDICINE

## 2018-11-28 PROCEDURE — 1036F TOBACCO NON-USER: CPT | Performed by: INTERNAL MEDICINE

## 2018-11-28 PROCEDURE — G8427 DOCREV CUR MEDS BY ELIG CLIN: HCPCS | Performed by: INTERNAL MEDICINE

## 2018-11-28 PROCEDURE — G8484 FLU IMMUNIZE NO ADMIN: HCPCS | Performed by: INTERNAL MEDICINE

## 2018-11-28 NOTE — PROGRESS NOTES
Patient ID:   Richar Vu is a 52 y.o. male    Chief Complaint:   Richar Vu presents for an evaluation of Type 1 Diabetes Mellitus , Hyperlipidemia and hypertension. Here with wife   Subjective:   Type 1 Diabetes Mellitus diagnosed in 1968. Counts carbs, comfortable counting     Medtronic 670G   Uses sensor     Basal: units per hour   Workday (active)   Mn: 1.00  6am: 1.15  Noon: 1.10   Noon: 0.90    Basal 1:  MN : 0.875  6am: 1.15  Noon 0.825    Correction 30   I:CHO MN: 1:11, 4pm: 1:12   Target    Active insulin time: 4 hours    Checks blood sugars 8-10 times per day. Reviewed and scanned. AM:   Lunch:  Supper:   HS:     Hypoglycemias: In the evenings, one a week numbers less than 40. Once had to use Glucagon in June 2018. Hypoglycemic unawareness: mild awareness in 60's. Glucagon used last week at night. Low blood sugars usually at night. Meals: Three, dinner is bigger. 3-4 snacks. Exercise: No treadmill. 5-20 minutes a day, treadmill walking     Denies chest pain, exertional dyspnea. Family history of CAD: None   Denies smoking/ alcohol. Currently on ASA 81 mg daily     The following portions of the patient's history were reviewed and updated as appropriate:       Family History   Problem Relation Age of Onset   Fernandez Backer Breast Cancer Mother     Diabetes Maternal Uncle         type 1    Cancer Paternal Grandmother     Cancer Paternal Grandfather          Social History     Social History    Marital status: Single     Spouse name: N/A    Number of children: N/A    Years of education: N/A     Occupational History    Not on file.      Social History Main Topics    Smoking status: Never Smoker    Smokeless tobacco: Never Used    Alcohol use No    Drug use: No    Sexual activity: Not on file     Other Topics Concern    Not on file     Social History Narrative    No narrative on file       Past Medical History:   Diagnosis Date    Diabetes mellitus ([address]     Insulin pump in place        Past Surgical History:   Procedure Laterality Date    UPPER GASTROINTESTINAL ENDOSCOPY         No Known Allergies      Current Outpatient Prescriptions:     atorvastatin (LIPITOR) 20 MG tablet, Take 1 tablet by mouth daily, Disp: 90 tablet, Rfl: 1    insulin aspart (NOVOLOG) 100 UNIT/ML injection vial, For insulin pump. Upto 80 units per day., Disp: 3 vial, Rfl: 5    aspirin 81 MG tablet, Take 81 mg by mouth daily, Disp: , Rfl:     glucagon (GLUCAGON EMERGENCY) 1 MG injection, Infuse 1 mg intravenously once for 1 dose, Disp: 1 kit, Rfl: 11      Review of Systems:    Constitutional: Negative for fever, chills, and unexpected weight change. HENT: Negative for congestion, ear pain, rhinorrhea,  sore throat and trouble swallowing. Eyes: Negative for photophobia, redness, itching. Respiratory: Negative for cough, shortness of breath and sputum. Cardiovascular: Negative for chest pain, palpitations and leg swelling. Gastrointestinal: Negative for nausea, vomiting, abdominal pain, diarrhea, constipation. Endocrine: Negative for cold intolerance, heat intolerance, polydipsia, polyphagia and polyuria. Genitourinary: Negative for dysuria, urgency, frequency, hematuria and flank pain. Musculoskeletal: Negative for myalgias, back pain, arthralgias and neck pain. Skin/Nail: Negative for rash, itching. Normal nails. Neurological: Negative for seizures, weakness, light-headedness, numbness and headaches. Hematological/ Lymph nodes: Negative for adenopathy. Does not bruise/bleed easily. Psychiatric/Behavioral: Negative for suicidal ideas, depression, anxiety, sleep disturbance and decreased concentration. Objective:   Physical Exam:  /78 (Site: Left Upper Arm, Position: Sitting, Cuff Size: Large Adult)   Pulse 50   Ht 5' 11\" (1.803 m)   Wt 187 lb (84.8 kg)   SpO2 100%   BMI 26.08 kg/m²   Constitutional: Patient is oriented to person, place, and time.  Patient -- > 4.5 hours     All instructions provided in written. Check Blood sugars 3-4 times per day. Log them along with insulin and send them every 2 weeks. Call for blood sugars less than 60 or more than 400. Eye exam: April 2018, early left retinopathy   Foot exam: Nov 2018    Deformity/amputation: absent  Skin lesions/pre-ulcerative calluses: absent  Edema: right- negative, left- negative    Sensory exam: Monofilament sensation: normal  Plus at least one of the following:   Pulses: 1 plus   Vibration (128 Hz):slightly decreased bilaterally     Renal screen: Normal March 2018    RD seen in 2017. TSH screen: normal with negative abs March 2018     2: HTN   Controlled     3: Hyperlipidemia   LDL: 93 , HDL: 52 , TGs: 41 July 2018    Atorvastatin 20mg daily at bedtime     A1C pending from today     RTC in 3 months with MACR, A1C     The total time spent face to face for this encounter was 45 minutes. Greater than 50% of the time was spent in counseling and coordination of care, treatment goals, benefits and risks of the treatment for diabetes, HTN, insulin pump, benefits of statins. EDUCATION:   Greater than 50% of this follow-up visit was spent in general counseling regarding   obesity, diet, exercise, importance of adherence to insulin regime, recognition and treatment of hypo and hyperglycemia,  glucose logging, proper diabetes management, diabetic complications with poor management and the importance of glycemic control in order to avoid the complications of diabetes. Risks and potential complications of diabetes were reviewed with the patient. Diabetes health maintenance plan and follow-up were discussed and understood by the patient. We reviewed the importance of medication compliance and regular follow-up. Aggressive lifestyle modification was encouraged. Exercise Counselling: This patient is a candidate for regular physical exercise.  Instructions to perform the following types of

## 2018-11-28 NOTE — PATIENT INSTRUCTIONS
Patient Education        Learning About Diabetes Food Guidelines  Your Care Instructions    Meal planning is important to manage diabetes. It helps keep your blood sugar at a target level (which you set with your doctor). You don't have to eat special foods. You can eat what your family eats, including sweets once in a while. But you do have to pay attention to how often you eat and how much you eat of certain foods. You may want to work with a dietitian or a certified diabetes educator (CDE) to help you plan meals and snacks. A dietitian or CDE can also help you lose weight if that is one of your goals. What should you know about eating carbs? Managing the amount of carbohydrate (carbs) you eat is an important part of healthy meals when you have diabetes. Carbohydrate is found in many foods. · Learn which foods have carbs. And learn the amounts of carbs in different foods. ? Bread, cereal, pasta, and rice have about 15 grams of carbs in a serving. A serving is 1 slice of bread (1 ounce), ½ cup of cooked cereal, or 1/3 cup of cooked pasta or rice. ? Fruits have 15 grams of carbs in a serving. A serving is 1 small fresh fruit, such as an apple or orange; ½ of a banana; ½ cup of cooked or canned fruit; ½ cup of fruit juice; 1 cup of melon or raspberries; or 2 tablespoons of dried fruit. ? Milk and no-sugar-added yogurt have 15 grams of carbs in a serving. A serving is 1 cup of milk or 2/3 cup of no-sugar-added yogurt. ? Starchy vegetables have 15 grams of carbs in a serving. A serving is ½ cup of mashed potatoes or sweet potato; 1 cup winter squash; ½ of a small baked potato; ½ cup of cooked beans; or ½ cup cooked corn or green peas. · Learn how much carbs to eat each day and at each meal. A dietitian or CDE can teach you how to keep track of the amount of carbs you eat. This is called carbohydrate counting. · If you are not sure how to count carbohydrate grams, use the Plate Method to plan meals.  It is a

## 2018-11-29 ENCOUNTER — TELEPHONE (OUTPATIENT)
Dept: ENDOCRINOLOGY | Age: 53
End: 2018-11-29

## 2018-11-29 LAB
ESTIMATED AVERAGE GLUCOSE: 111.2 MG/DL
HBA1C MFR BLD: 5.5 %

## 2018-11-29 NOTE — TELEPHONE ENCOUNTER
Pt was returning April's call and he was informed of the results. Pt was surprised that his A1C was that low but he has already started taking steps to improve that.

## 2018-12-31 DIAGNOSIS — E78.00 TYPE 1 DIABETES MELLITUS WITH HYPERCHOLESTEROLEMIA (HCC): ICD-10-CM

## 2018-12-31 DIAGNOSIS — E10.69 TYPE 1 DIABETES MELLITUS WITH HYPERCHOLESTEROLEMIA (HCC): ICD-10-CM

## 2019-03-08 ENCOUNTER — OFFICE VISIT (OUTPATIENT)
Dept: ENDOCRINOLOGY | Age: 54
End: 2019-03-08
Payer: COMMERCIAL

## 2019-03-08 VITALS
DIASTOLIC BLOOD PRESSURE: 79 MMHG | OXYGEN SATURATION: 100 % | SYSTOLIC BLOOD PRESSURE: 131 MMHG | WEIGHT: 191 LBS | HEART RATE: 56 BPM | BODY MASS INDEX: 26.74 KG/M2 | HEIGHT: 71 IN

## 2019-03-08 DIAGNOSIS — Z96.41 INSULIN PUMP IN PLACE: ICD-10-CM

## 2019-03-08 DIAGNOSIS — E10.3299 TYPE 1 DIABETES MELLITUS WITH BACKGROUND DIABETIC RETINOPATHY (HCC): ICD-10-CM

## 2019-03-08 DIAGNOSIS — E10.69 TYPE 1 DIABETES MELLITUS WITH HYPERCHOLESTEROLEMIA (HCC): ICD-10-CM

## 2019-03-08 DIAGNOSIS — E78.00 TYPE 1 DIABETES MELLITUS WITH HYPERCHOLESTEROLEMIA (HCC): ICD-10-CM

## 2019-03-08 DIAGNOSIS — E10.649 HYPOGLYCEMIC UNAWARENESS ASSOCIATED WITH TYPE 1 DIABETES MELLITUS: ICD-10-CM

## 2019-03-08 DIAGNOSIS — E10.42 CONTROLLED TYPE 1 DIABETES MELLITUS WITH DIABETIC POLYNEUROPATHY (HCC): ICD-10-CM

## 2019-03-08 DIAGNOSIS — E10.42 CONTROLLED TYPE 1 DIABETES MELLITUS WITH DIABETIC POLYNEUROPATHY (HCC): Primary | ICD-10-CM

## 2019-03-08 DIAGNOSIS — Z46.81 INSULIN PUMP TITRATION: ICD-10-CM

## 2019-03-08 PROBLEM — I10 ESSENTIAL HYPERTENSION: Status: ACTIVE | Noted: 2019-03-08

## 2019-03-08 LAB
CREATININE URINE: 113.7 MG/DL (ref 39–259)
MICROALBUMIN UR-MCNC: <1.2 MG/DL
MICROALBUMIN/CREAT UR-RTO: NORMAL MG/G (ref 0–30)

## 2019-03-08 PROCEDURE — G8419 CALC BMI OUT NRM PARAM NOF/U: HCPCS | Performed by: INTERNAL MEDICINE

## 2019-03-08 PROCEDURE — 3017F COLORECTAL CA SCREEN DOC REV: CPT | Performed by: INTERNAL MEDICINE

## 2019-03-08 PROCEDURE — 2022F DILAT RTA XM EVC RTNOPTHY: CPT | Performed by: INTERNAL MEDICINE

## 2019-03-08 PROCEDURE — 95251 CONT GLUC MNTR ANALYSIS I&R: CPT | Performed by: INTERNAL MEDICINE

## 2019-03-08 PROCEDURE — G8484 FLU IMMUNIZE NO ADMIN: HCPCS | Performed by: INTERNAL MEDICINE

## 2019-03-08 PROCEDURE — G8427 DOCREV CUR MEDS BY ELIG CLIN: HCPCS | Performed by: INTERNAL MEDICINE

## 2019-03-08 PROCEDURE — 1036F TOBACCO NON-USER: CPT | Performed by: INTERNAL MEDICINE

## 2019-03-08 PROCEDURE — 99214 OFFICE O/P EST MOD 30 MIN: CPT | Performed by: INTERNAL MEDICINE

## 2019-03-08 PROCEDURE — 3046F HEMOGLOBIN A1C LEVEL >9.0%: CPT | Performed by: INTERNAL MEDICINE

## 2019-03-08 RX ORDER — ATORVASTATIN CALCIUM 20 MG/1
20 TABLET, FILM COATED ORAL DAILY
Qty: 90 TABLET | Refills: 3 | Status: SHIPPED | OUTPATIENT
Start: 2019-03-08 | End: 2019-11-23 | Stop reason: SDUPTHER

## 2019-03-09 LAB
ESTIMATED AVERAGE GLUCOSE: 119.8 MG/DL
HBA1C MFR BLD: 5.8 %

## 2019-03-20 DIAGNOSIS — E10.42 TYPE 1 DIABETES MELLITUS WITH POLYNEUROPATHY (HCC): ICD-10-CM

## 2019-03-21 RX ORDER — BLOOD-GLUCOSE METER
KIT MISCELLANEOUS
Qty: 200 EACH | Refills: 2 | Status: SHIPPED | OUTPATIENT
Start: 2019-03-21 | End: 2020-07-10 | Stop reason: SDUPTHER

## 2019-06-11 NOTE — TELEPHONE ENCOUNTER
[facility] called to have the escribe resent over to them for Novolog instead of Lespro that was escribed recently.  [facility] [phone]

## 2019-06-11 NOTE — TELEPHONE ENCOUNTER
Darren Sosa informed insurance requested Humalog in place of Novolog. The pharmacist stated she cancelled RX for the Humalog. Please resend.

## 2019-07-12 ENCOUNTER — OFFICE VISIT (OUTPATIENT)
Dept: ENDOCRINOLOGY | Age: 54
End: 2019-07-12
Payer: COMMERCIAL

## 2019-07-12 VITALS
SYSTOLIC BLOOD PRESSURE: 142 MMHG | OXYGEN SATURATION: 98 % | WEIGHT: 192 LBS | BODY MASS INDEX: 26.88 KG/M2 | DIASTOLIC BLOOD PRESSURE: 81 MMHG | HEART RATE: 52 BPM | HEIGHT: 71 IN

## 2019-07-12 DIAGNOSIS — I10 ESSENTIAL HYPERTENSION: ICD-10-CM

## 2019-07-12 DIAGNOSIS — Z46.81 INSULIN PUMP TITRATION: ICD-10-CM

## 2019-07-12 DIAGNOSIS — E10.69 TYPE 1 DIABETES MELLITUS WITH HYPERCHOLESTEROLEMIA (HCC): Primary | ICD-10-CM

## 2019-07-12 DIAGNOSIS — E10.649 HYPOGLYCEMIC UNAWARENESS ASSOCIATED WITH TYPE 1 DIABETES MELLITUS: ICD-10-CM

## 2019-07-12 DIAGNOSIS — E10.42 CONTROLLED TYPE 1 DIABETES MELLITUS WITH DIABETIC POLYNEUROPATHY (HCC): ICD-10-CM

## 2019-07-12 DIAGNOSIS — E10.3299 TYPE 1 DIABETES MELLITUS WITH BACKGROUND DIABETIC RETINOPATHY (HCC): ICD-10-CM

## 2019-07-12 DIAGNOSIS — E78.00 TYPE 1 DIABETES MELLITUS WITH HYPERCHOLESTEROLEMIA (HCC): Primary | ICD-10-CM

## 2019-07-12 DIAGNOSIS — Z96.41 INSULIN PUMP IN PLACE: ICD-10-CM

## 2019-07-12 PROCEDURE — 2022F DILAT RTA XM EVC RTNOPTHY: CPT | Performed by: INTERNAL MEDICINE

## 2019-07-12 PROCEDURE — 1036F TOBACCO NON-USER: CPT | Performed by: INTERNAL MEDICINE

## 2019-07-12 PROCEDURE — 3044F HG A1C LEVEL LT 7.0%: CPT | Performed by: INTERNAL MEDICINE

## 2019-07-12 PROCEDURE — G8427 DOCREV CUR MEDS BY ELIG CLIN: HCPCS | Performed by: INTERNAL MEDICINE

## 2019-07-12 PROCEDURE — 3017F COLORECTAL CA SCREEN DOC REV: CPT | Performed by: INTERNAL MEDICINE

## 2019-07-12 PROCEDURE — 99214 OFFICE O/P EST MOD 30 MIN: CPT | Performed by: INTERNAL MEDICINE

## 2019-07-12 PROCEDURE — G8419 CALC BMI OUT NRM PARAM NOF/U: HCPCS | Performed by: INTERNAL MEDICINE

## 2019-07-12 PROCEDURE — 95251 CONT GLUC MNTR ANALYSIS I&R: CPT | Performed by: INTERNAL MEDICINE

## 2019-07-12 NOTE — PROGRESS NOTES
Patient ID:   Eben Brittle is a 52 y.o. male    Chief Complaint:   Eben Brittle presents for an evaluation of Type 1 Diabetes Mellitus , Hyperlipidemia and hypertension. Here with wife   Subjective:   Type 1 Diabetes Mellitus diagnosed in 1968. Counts carbs, comfortable counting     Medtronic 670G   Uses sensor   Wife made some change sin basal rate, she noticed many notifications of hypoglycemias, drinking juices many nights     Basal:2 ( units per hour)   (active)   Mn: 0.850  6am: 1.25  8 am: 1.15  Noon: 1.05   6pm: 0.925  10pm: 0.85    Basal 1:  MN : 0.900  6am: 1.15  Noon 1.05  6pm: 0.925    Correction 30   I:CHO MN: 1:16   Target    Active insulin time: 4.0 hours    Checks blood sugars 8-10 times per day. Reviewed and scanned. AM: -, 215  Lunch:, 217  Supper:   HS: , 207    Hypoglycemias: sensor suspended pump 5 times in last 2 weeks. Hypoglycemic unawareness: mild awareness in 60's. Meals: Three, dinner is bigger. 3-4 snacks. Exercise: No treadmill. 5-20 minutes a day, treadmill walking     Denies chest pain, exertional dyspnea. Family history of CAD: None   Denies smoking/ alcohol.    Currently on ASA 81 mg daily     The following portions of the patient's history were reviewed and updated as appropriate:       Family History   Problem Relation Age of Onset    Breast Cancer Mother     Diabetes Maternal Uncle         type 1    Cancer Paternal Grandmother     Cancer Paternal Grandfather          Social History     Socioeconomic History    Marital status: Single     Spouse name: Not on file    Number of children: Not on file    Years of education: Not on file    Highest education level: Not on file   Occupational History    Not on file   Social Needs    Financial resource strain: Not on file    Food insecurity:     Worry: Not on file     Inability: Not on file    Transportation needs:     Medical: Not on file     Non-medical: Not on file Tobacco Use    Smoking status: Never Smoker    Smokeless tobacco: Never Used   Substance and Sexual Activity    Alcohol use: No    Drug use: No    Sexual activity: Not on file   Lifestyle    Physical activity:     Days per week: Not on file     Minutes per session: Not on file    Stress: Not on file   Relationships    Social connections:     Talks on phone: Not on file     Gets together: Not on file     Attends [employer] service: Not on file     Active member of club or organization: Not on file     Attends meetings of clubs or organizations: Not on file     Relationship status: Not on file    Intimate partner violence:     Fear of current or ex partner: Not on file     Emotionally abused: Not on file     Physically abused: Not on file     Forced sexual activity: Not on file   Other Topics Concern    Not on file   Social History Narrative    Not on file       Past Medical History:   Diagnosis Date    Diabetes mellitus ([facility] [address]     Insulin pump in place        Past Surgical History:   Procedure Laterality Date    UPPER GASTROINTESTINAL ENDOSCOPY         No Known Allergies      Current Outpatient Medications:     insulin lispro (ADMELOG) 100 UNIT/ML injection vial, Inject into the skin 3 times daily (before meals), Disp: , Rfl:     blood glucose test strips (CONTOUR NEXT TEST) strip, 6 each by In Vitro route daily As needed. , Disp: 300 each, Rfl: 3    FREESTYLE LITE strip, USE 4 STRIPS TO CHECK GLUCOSE ONCE DAILY AS NEEDED, Disp: 200 each, Rfl: 2    atorvastatin (LIPITOR) 20 MG tablet, Take 1 tablet by mouth daily, Disp: 90 tablet, Rfl: 3    aspirin 81 MG tablet, Take 1 tablet by mouth daily, Disp: 30 tablet, Rfl: 5    glucagon (GLUCAGON EMERGENCY) 1 MG injection, Infuse 1 mg intravenously once for 1 dose, Disp: 1 kit, Rfl: 11      Review of Systems:    Constitutional: Negative for fever, chills, and unexpected weight change.    HENT: Negative for congestion, ear pain, rhinorrhea,  sore throat and Microalbumin, Random Urine 03/08/2019 <1.20  <2.0 mg/dL Final    Creatinine, Ur 03/08/2019 113.7  39.0 - 259.0 mg/dL Final    Microalbumin Creatinine Ratio 03/08/2019 see below  0.0 - 30.0 mg/g Final    Hemoglobin A1C 03/08/2019 5.8  See comment % Final    eAG 03/08/2019 119.8  mg/dL Final   Orders Only on 11/28/2018   Component Date Value Ref Range Status    Hemoglobin A1C 11/28/2018 5.5  See comment % Final    eAG 11/28/2018 111.2  mg/dL Final   Orders Only on 07/27/2018   Component Date Value Ref Range Status    Hemoglobin A1C 07/27/2018 6.2  See comment % Final    eAG 07/27/2018 131.2  mg/dL Final    Cholesterol, Total 07/27/2018 153  0 - 199 mg/dL Final    Triglycerides 07/27/2018 41  0 - 150 mg/dL Final    HDL 07/27/2018 52  40 - 60 mg/dL Final    LDL Calculated 07/27/2018 93  <100 mg/dL Final    VLDL Cholesterol Calculated 07/27/2018 8  Not Established mg/dL Final           Assessment and Plan     Oxana Carpenter was seen today for diabetes. Diagnoses and all orders for this visit:    Type 1 diabetes mellitus with hypercholesterolemia ([facility] [address]  -     Hemoglobin A1C; Future  -     Lipid, Fasting; Future  -     Comprehensive Metabolic Panel; Future  -     blood glucose test strips (CONTOUR NEXT TEST) strip; 6 each by In Vitro route daily As needed. Type 1 diabetes mellitus with background diabetic retinopathy ([address]  -     Hemoglobin A1C; Future  -     Lipid, Fasting; Future  -     Comprehensive Metabolic Panel; Future    Hypoglycemic unawareness associated with type 1 diabetes mellitus ([address]    Controlled type 1 diabetes mellitus with diabetic polyneuropathy (HCC)    Essential hypertension    Insulin pump in place    Insulin pump titration      1: Type 1 DM complicated with hypoglycemia, retinopathy and neuropathy   Controlled A1C 5.8 % < 5.5% < 6.2% << 6.6%    CMP normal     CGM report: He wears sensor every day.  Sensor downloaded, data reviewed and scanned  Blood sugars are in good range 88%, Low normal 11%, lows 1%, hyperglycemia 0 %   Blood sugars drop some days of the week around 3am or 5am    During the day blood sugars are running well   No post meal hyperglycemia after carb diet   No activity related changes in blood sugars   Hypoglycemia: in the morning as above. Multiple pump suspensions every day. Based on the data, change basal rates as below     He was in manual mode. Rule of 15 for hypoglycemic episodes discussed   I showed how to use temporary basal settings     Keep changing pump site every 3-4 days   Pump downloads are reviewed, discussed with the patient and sent for scanning   Always use bolus wizards    He sometimes puts less carbs in the pump     Pump settings reviewed   Basal:2 ( units per hour)   (active)   Mn: 0.850--> 0.800  6am: 1.25 --> 1.10  8:30 am: 1.15 --> 1.00  Noon: 1.05  --> 0.90  6pm: 0.925 --> 0.80  10pm: 0.85 - stop     Basal 1:  MN : 0.900  6am: 1.15  Noon 1.05  6pm: 0.925    Correction 30   I:CHO MN: 1:16   Target  -->    Active insulin time: 4.0 hours    All instructions provided in written. Check Blood sugars 3-4 times per day. Log them along with insulin and send them every 2 weeks. Call for blood sugars less than 60 or more than 400. Eye exam: April 2018, early left retinopathy. Due for now. Foot exam: Nov 2018    Deformity/amputation: absent  Skin lesions/pre-ulcerative calluses: absent  Edema: right- negative, left- negative    Sensory exam: Monofilament sensation: normal  Plus at least one of the following:   Pulses: 1 plus   Vibration (128 Hz):slightly decreased bilaterally     Renal screen: Normal March 2019     RD seen in 2017.    TSH screen: normal with negative abs March 2018     2: HTN   Controlled with out meds  Slightly high today   Will consider adding a low dose ACE I kevin if MACR is high     3: Hyperlipidemia   LDL: 93 , HDL: 52 , TGs: 41 July 2018    Atorvastatin 20mg daily at bedtime     A1C, lipids, CMP today     RTC in 3 months

## 2019-08-08 ENCOUNTER — TELEPHONE (OUTPATIENT)
Dept: ENDOCRINOLOGY | Age: 54
End: 2019-08-08

## 2019-08-08 DIAGNOSIS — E10.69 TYPE 1 DIABETES MELLITUS WITH HYPERCHOLESTEROLEMIA (HCC): Primary | ICD-10-CM

## 2019-08-08 DIAGNOSIS — E78.00 TYPE 1 DIABETES MELLITUS WITH HYPERCHOLESTEROLEMIA (HCC): Primary | ICD-10-CM

## 2019-08-20 ENCOUNTER — TELEPHONE (OUTPATIENT)
Dept: ENDOCRINOLOGY | Age: 54
End: 2019-08-20

## 2019-11-14 PROBLEM — Z91.199 NO-SHOW FOR APPOINTMENT: Status: ACTIVE | Noted: 2019-11-14

## 2019-11-14 PROBLEM — H25.13 AGE-RELATED NUCLEAR CATARACT OF BOTH EYES: Status: ACTIVE | Noted: 2019-11-14

## 2019-11-22 ENCOUNTER — OFFICE VISIT (OUTPATIENT)
Dept: ENDOCRINOLOGY | Age: 54
End: 2019-11-22
Payer: COMMERCIAL

## 2019-11-22 VITALS
BODY MASS INDEX: 26.85 KG/M2 | HEART RATE: 58 BPM | SYSTOLIC BLOOD PRESSURE: 132 MMHG | DIASTOLIC BLOOD PRESSURE: 75 MMHG | OXYGEN SATURATION: 100 % | WEIGHT: 191.8 LBS | HEIGHT: 71 IN

## 2019-11-22 DIAGNOSIS — E78.00 TYPE 1 DIABETES MELLITUS WITH HYPERCHOLESTEROLEMIA (HCC): ICD-10-CM

## 2019-11-22 DIAGNOSIS — E10.3299 TYPE 1 DIABETES MELLITUS WITH BACKGROUND DIABETIC RETINOPATHY (HCC): ICD-10-CM

## 2019-11-22 DIAGNOSIS — E10.69 TYPE 1 DIABETES MELLITUS WITH HYPERCHOLESTEROLEMIA (HCC): ICD-10-CM

## 2019-11-22 DIAGNOSIS — E10.42 CONTROLLED TYPE 1 DIABETES MELLITUS WITH DIABETIC POLYNEUROPATHY (HCC): ICD-10-CM

## 2019-11-22 DIAGNOSIS — Z96.41 INSULIN PUMP IN PLACE: ICD-10-CM

## 2019-11-22 DIAGNOSIS — H25.13 AGE-RELATED NUCLEAR CATARACT OF BOTH EYES: ICD-10-CM

## 2019-11-22 DIAGNOSIS — E10.649 HYPOGLYCEMIC UNAWARENESS ASSOCIATED WITH TYPE 1 DIABETES MELLITUS: Primary | ICD-10-CM

## 2019-11-22 DIAGNOSIS — Z46.81 INSULIN PUMP TITRATION: ICD-10-CM

## 2019-11-22 DIAGNOSIS — I10 ESSENTIAL HYPERTENSION: ICD-10-CM

## 2019-11-22 LAB
A/G RATIO: 2 (ref 1.1–2.2)
ALBUMIN SERPL-MCNC: 4.7 G/DL (ref 3.4–5)
ALP BLD-CCNC: 60 U/L (ref 40–129)
ALT SERPL-CCNC: 18 U/L (ref 10–40)
ANION GAP SERPL CALCULATED.3IONS-SCNC: 14 MMOL/L (ref 3–16)
AST SERPL-CCNC: 18 U/L (ref 15–37)
BILIRUB SERPL-MCNC: 0.7 MG/DL (ref 0–1)
BUN BLDV-MCNC: 18 MG/DL (ref 7–20)
CALCIUM SERPL-MCNC: 9.6 MG/DL (ref 8.3–10.6)
CHLORIDE BLD-SCNC: 107 MMOL/L (ref 99–110)
CHOLESTEROL, FASTING: 173 MG/DL (ref 0–199)
CO2: 26 MMOL/L (ref 21–32)
CREAT SERPL-MCNC: 1 MG/DL (ref 0.9–1.3)
GFR AFRICAN AMERICAN: >60
GFR NON-AFRICAN AMERICAN: >60
GLOBULIN: 2.3 G/DL
GLUCOSE BLD-MCNC: 56 MG/DL (ref 70–99)
HDLC SERPL-MCNC: 55 MG/DL (ref 40–60)
LDL CHOLESTEROL CALCULATED: 104 MG/DL
POTASSIUM SERPL-SCNC: 4.1 MMOL/L (ref 3.5–5.1)
SODIUM BLD-SCNC: 147 MMOL/L (ref 136–145)
TOTAL PROTEIN: 7 G/DL (ref 6.4–8.2)
TRIGLYCERIDE, FASTING: 71 MG/DL (ref 0–150)
VLDLC SERPL CALC-MCNC: 14 MG/DL

## 2019-11-22 PROCEDURE — 3017F COLORECTAL CA SCREEN DOC REV: CPT | Performed by: INTERNAL MEDICINE

## 2019-11-22 PROCEDURE — G8419 CALC BMI OUT NRM PARAM NOF/U: HCPCS | Performed by: INTERNAL MEDICINE

## 2019-11-22 PROCEDURE — 95251 CONT GLUC MNTR ANALYSIS I&R: CPT | Performed by: INTERNAL MEDICINE

## 2019-11-22 PROCEDURE — 99214 OFFICE O/P EST MOD 30 MIN: CPT | Performed by: INTERNAL MEDICINE

## 2019-11-22 PROCEDURE — 3044F HG A1C LEVEL LT 7.0%: CPT | Performed by: INTERNAL MEDICINE

## 2019-11-22 PROCEDURE — 1036F TOBACCO NON-USER: CPT | Performed by: INTERNAL MEDICINE

## 2019-11-22 PROCEDURE — 2022F DILAT RTA XM EVC RTNOPTHY: CPT | Performed by: INTERNAL MEDICINE

## 2019-11-22 PROCEDURE — G8427 DOCREV CUR MEDS BY ELIG CLIN: HCPCS | Performed by: INTERNAL MEDICINE

## 2019-11-22 PROCEDURE — G8484 FLU IMMUNIZE NO ADMIN: HCPCS | Performed by: INTERNAL MEDICINE

## 2019-11-22 RX ORDER — SUBCUTANEOUS INSULIN PUMP
EACH MISCELLANEOUS
COMMUNITY

## 2019-11-23 DIAGNOSIS — E78.00 TYPE 1 DIABETES MELLITUS WITH HYPERCHOLESTEROLEMIA (HCC): ICD-10-CM

## 2019-11-23 DIAGNOSIS — E10.69 TYPE 1 DIABETES MELLITUS WITH HYPERCHOLESTEROLEMIA (HCC): ICD-10-CM

## 2019-11-23 LAB
ESTIMATED AVERAGE GLUCOSE: 119.8 MG/DL
HBA1C MFR BLD: 5.8 %

## 2019-11-23 RX ORDER — ATORVASTATIN CALCIUM 40 MG/1
40 TABLET, FILM COATED ORAL DAILY
Qty: 90 TABLET | Refills: 3 | Status: SHIPPED | OUTPATIENT
Start: 2019-11-23 | End: 2019-12-18 | Stop reason: SDUPTHER

## 2019-11-27 ENCOUNTER — TELEPHONE (OUTPATIENT)
Dept: ENDOCRINOLOGY | Age: 54
End: 2019-11-27

## 2019-12-18 DIAGNOSIS — E10.69 TYPE 1 DIABETES MELLITUS WITH HYPERCHOLESTEROLEMIA (HCC): ICD-10-CM

## 2019-12-18 DIAGNOSIS — E78.00 TYPE 1 DIABETES MELLITUS WITH HYPERCHOLESTEROLEMIA (HCC): ICD-10-CM

## 2019-12-18 RX ORDER — ATORVASTATIN CALCIUM 40 MG/1
40 TABLET, FILM COATED ORAL DAILY
Qty: 90 TABLET | Refills: 3 | Status: SHIPPED | OUTPATIENT
Start: 2019-12-18

## 2020-02-12 ENCOUNTER — TELEPHONE (OUTPATIENT)
Dept: ENDOCRINOLOGY | Age: 55
End: 2020-02-12

## 2020-02-12 NOTE — TELEPHONE ENCOUNTER
Pt called states that his insulin was written a different way than before and now he has issues with his Insurance.  He is requesting a call back

## 2020-02-28 ENCOUNTER — OFFICE VISIT (OUTPATIENT)
Dept: ENDOCRINOLOGY | Age: 55
End: 2020-02-28
Payer: COMMERCIAL

## 2020-02-28 VITALS
SYSTOLIC BLOOD PRESSURE: 141 MMHG | BODY MASS INDEX: 27.94 KG/M2 | DIASTOLIC BLOOD PRESSURE: 85 MMHG | HEIGHT: 71 IN | WEIGHT: 199.6 LBS | HEART RATE: 58 BPM | OXYGEN SATURATION: 99 %

## 2020-02-28 DIAGNOSIS — E10.69 TYPE 1 DIABETES MELLITUS WITH HYPERCHOLESTEROLEMIA (HCC): ICD-10-CM

## 2020-02-28 DIAGNOSIS — E10.3299 TYPE 1 DIABETES MELLITUS WITH BACKGROUND DIABETIC RETINOPATHY (HCC): ICD-10-CM

## 2020-02-28 DIAGNOSIS — E10.649 HYPOGLYCEMIC UNAWARENESS ASSOCIATED WITH TYPE 1 DIABETES MELLITUS: ICD-10-CM

## 2020-02-28 DIAGNOSIS — E10.42 CONTROLLED TYPE 1 DIABETES MELLITUS WITH DIABETIC POLYNEUROPATHY (HCC): ICD-10-CM

## 2020-02-28 DIAGNOSIS — E78.00 TYPE 1 DIABETES MELLITUS WITH HYPERCHOLESTEROLEMIA (HCC): ICD-10-CM

## 2020-02-28 LAB
CREATININE URINE: 72.4 MG/DL (ref 39–259)
MICROALBUMIN UR-MCNC: <1.2 MG/DL
MICROALBUMIN/CREAT UR-RTO: NORMAL MG/G (ref 0–30)

## 2020-02-28 PROCEDURE — 2022F DILAT RTA XM EVC RTNOPTHY: CPT | Performed by: INTERNAL MEDICINE

## 2020-02-28 PROCEDURE — G8427 DOCREV CUR MEDS BY ELIG CLIN: HCPCS | Performed by: INTERNAL MEDICINE

## 2020-02-28 PROCEDURE — G8419 CALC BMI OUT NRM PARAM NOF/U: HCPCS | Performed by: INTERNAL MEDICINE

## 2020-02-28 PROCEDURE — G8484 FLU IMMUNIZE NO ADMIN: HCPCS | Performed by: INTERNAL MEDICINE

## 2020-02-28 PROCEDURE — 3046F HEMOGLOBIN A1C LEVEL >9.0%: CPT | Performed by: INTERNAL MEDICINE

## 2020-02-28 PROCEDURE — 99214 OFFICE O/P EST MOD 30 MIN: CPT | Performed by: INTERNAL MEDICINE

## 2020-02-28 PROCEDURE — 3017F COLORECTAL CA SCREEN DOC REV: CPT | Performed by: INTERNAL MEDICINE

## 2020-02-28 PROCEDURE — 1036F TOBACCO NON-USER: CPT | Performed by: INTERNAL MEDICINE

## 2020-02-28 PROCEDURE — 95251 CONT GLUC MNTR ANALYSIS I&R: CPT | Performed by: INTERNAL MEDICINE

## 2020-02-28 NOTE — PROGRESS NOTES
Patient ID:   Alcide Halsted is a 62 y.o. male    Chief Complaint:   Alcide Halsted presents for an evaluation of Type 1 Diabetes Mellitus , Hyperlipidemia and hypertension. Subjective:   Type 1 Diabetes Mellitus diagnosed in 1968. Counts carbs, comfortable counting     Medtronic 670G   Uses sensor   Wife made some change sin basal rate, she noticed many notifications of hypoglycemias, drinking juices many nights     Basal: ( units per hour)   Mn: 1.00  6am: 1.10  Noon: 0.925  6pm: 0.900    Correction 20  I:CHO MN: 1:15   Target     Active insulin time: 3.45 hours    Checks blood sugars 8-10 times per day. Reviewed and scanned. AM:    Lunch:   Supper:    HS:      Hypoglycemias:Between MN to 7am, lowest 40. Hypoglycemic unawareness: mild awareness in 60's. Meals: Three, dinner is bigger. 3-4 snacks. Exercise: No treadmill. 5-20 minutes a day, treadmill walking     Denies chest pain, exertional dyspnea. Family history of CAD: None   Denies smoking/ alcohol.    Currently on ASA 81 mg daily     The following portions of the patient's history were reviewed and updated as appropriate:       Family History   Problem Relation Age of Onset    Breast Cancer Mother     Diabetes Maternal Uncle         type 1    Cancer Paternal Grandmother     Cancer Paternal Grandfather          Social History     Socioeconomic History    Marital status: Single     Spouse name: Not on file    Number of children: Not on file    Years of education: Not on file    Highest education level: Not on file   Occupational History    Not on file   Social Needs    Financial resource strain: Not on file    Food insecurity:     Worry: Not on file     Inability: Not on file    Transportation needs:     Medical: Not on file     Non-medical: Not on file   Tobacco Use    Smoking status: Never Smoker    Smokeless tobacco: Never Used   Substance and Sexual Activity    Alcohol use: No    Drug use: No    Sexual itching. Respiratory: Negative for cough, shortness of breath and sputum. Cardiovascular: Negative for chest pain, palpitations and leg swelling. Gastrointestinal: Negative for nausea, vomiting, abdominal pain, diarrhea, constipation. Endocrine: Negative for cold intolerance, heat intolerance, polydipsia, polyphagia and polyuria. Genitourinary: Negative for dysuria, urgency, frequency, hematuria and flank pain. Musculoskeletal: Negative for myalgias, back pain, arthralgias and neck pain. Skin/Nail: Negative for rash, itching. Normal nails. Neurological: Negative for seizures, weakness, light-headedness, numbness and headaches. Hematological/ Lymph nodes: Negative for adenopathy. Does not bruise/bleed easily. Psychiatric/Behavioral: Negative for suicidal ideas, depression, anxiety, sleep disturbance and decreased concentration. Objective:   Physical Exam:  BP (!) 144/80 (Site: Right Upper Arm, Position: Sitting, Cuff Size: Large Adult)   Pulse 58   Ht 5' 11\" (1.803 m)   Wt 199 lb 9.6 oz (90.5 kg)   SpO2 99%   BMI 27.84 kg/m²   Constitutional: Patient is oriented to person, place, and time. Patient appears well-developed and well-nourished. HENT:    Head: Normocephalic and atraumatic. Eyes: Conjunctivae and EOM are normal. Pupils are equal, round, and reactive to light. Neck: Normal range of motion. Cardiovascular: Bradycardia, regular rhythm and normal heart sounds. Pulmonary/Chest: Effort normal and breath sounds normal.   Abdominal: Soft. Bowel sounds are normal.   Musculoskeletal: Normal range of motion. Neurological: Patient is alert and oriented to person, place, and time. Patient has normal reflexes. Skin: Skin is warm and dry. Psychiatric: Patient has a normal mood and affect.  Patient behavior is normal.     Lab Review:    Orders Only on 11/22/2019   Component Date Value Ref Range Status    Sodium 11/22/2019 147* 136 - 145 mmol/L Final    Potassium 11/22/2019 4.1  3.5 - 5.1 mmol/L Final    Chloride 11/22/2019 107  99 - 110 mmol/L Final    CO2 11/22/2019 26  21 - 32 mmol/L Final    Anion Gap 11/22/2019 14  3 - 16 Final    Glucose 11/22/2019 56* 70 - 99 mg/dL Final    BUN 11/22/2019 18  7 - 20 mg/dL Final    CREATININE 11/22/2019 1.0  0.9 - 1.3 mg/dL Final    GFR Non- 11/22/2019 >60  >60 Final    GFR  11/22/2019 >60  >60 Final    Calcium 11/22/2019 9.6  8.3 - 10.6 mg/dL Final    Total Protein 11/22/2019 7.0  6.4 - 8.2 g/dL Final    Alb 11/22/2019 4.7  3.4 - 5.0 g/dL Final    Albumin/Globulin Ratio 11/22/2019 2.0  1.1 - 2.2 Final    Total Bilirubin 11/22/2019 0.7  0.0 - 1.0 mg/dL Final    Alkaline Phosphatase 11/22/2019 60  40 - 129 U/L Final    ALT 11/22/2019 18  10 - 40 U/L Final    AST 11/22/2019 18  15 - 37 U/L Final    Globulin 11/22/2019 2.3  g/dL Final    Cholesterol, Fasting 11/22/2019 173  0 - 199 mg/dL Final    Triglyceride, Fasting 11/22/2019 71  0 - 150 mg/dL Final    HDL 11/22/2019 55  40 - 60 mg/dL Final    LDL Calculated 11/22/2019 104* <100 mg/dL Final    VLDL Cholesterol Calculated 11/22/2019 14  Not Established mg/dL Final    Hemoglobin A1C 11/22/2019 5.8  See comment % Final    eAG 11/22/2019 119.8  mg/dL Final   Orders Only on 03/08/2019   Component Date Value Ref Range Status    Microalbumin, Random Urine 03/08/2019 <1.20  <2.0 mg/dL Final    Creatinine, Ur 03/08/2019 113.7  39.0 - 259.0 mg/dL Final    Microalbumin Creatinine Ratio 03/08/2019 see below  0.0 - 30.0 mg/g Final    Hemoglobin A1C 03/08/2019 5.8  See comment % Final    eAG 03/08/2019 119.8  mg/dL Final           Assessment and Plan     Neil Zhu was seen today for diabetes.     Diagnoses and all orders for this visit:    Controlled type 1 diabetes mellitus with diabetic polyneuropathy ([facility] [address]  -     Syringe/Needle, Disp, (SYRINGE 3CC/25GX5/8\") 25G X 5/8\" 3 ML MISC; Use in the event of pump failure  -     Hemoglobin A1C; Future  -     Microalbumin / Creatinine Urine Ratio; Future  -     External Referral to Endocrinology    Hypoglycemic unawareness associated with type 1 diabetes mellitus (HCC)  -     Syringe/Needle, Disp, (SYRINGE 3CC/25GX5/8\") 25G X 5/8\" 3 ML MISC; Use in the event of pump failure  -     Hemoglobin A1C; Future  -     Microalbumin / Creatinine Urine Ratio; Future  -     External Referral to Endocrinology    Type 1 diabetes mellitus with hypercholesterolemia ([facility]  -     Syringe/Needle, Disp, (SYRINGE 3CC/25GX5/8\") 25G X 5/8\" 3 ML MISC; Use in the event of pump failure  -     Hemoglobin A1C; Future  -     Microalbumin / Creatinine Urine Ratio; Future  -     External Referral to Endocrinology    Type 1 diabetes mellitus with background diabetic retinopathy ([facility]  -     Syringe/Needle, Disp, (SYRINGE 3CC/25GX5/8\") 25G X 5/8\" 3 ML MISC; Use in the event of pump failure  -     Hemoglobin A1C; Future  -     Microalbumin / Creatinine Urine Ratio; Future  -     External Referral to Endocrinology    Insulin pump in place    Insulin pump titration      1: Type 1 DM complicated with hypoglycemia, retinopathy and neuropathy   Controlled A1C 5.8 % < 5.5% < 6.2% << 6.6%    CMP normal     CGM report: He wears sensor every day. Sensor downloaded, data reviewed and scanned  Average glucose 116  Blood sugars are in good range 75%, Low normal  18%,  severe hypoglycemias 7%   Blood sugars drop some days of the week between MN-6am    During the day blood sugars are running well   No post meal hyperglycemia after carb diet   No activity related changes in blood sugars   Hypoglycemia: in the morning as above and sometimes after food .     Based on the data, changes are below      Rule of 15 for hypoglycemic episodes discussed   I showed how to use temporary basal settings     Keep changing pump site every 3-4 days   Pump downloads are reviewed, discussed with the patient and sent for scanning   Always use bolus wizards    He sometimes puts less carbs in the pump     Pump settings reviewed   Basal:  ( units per hour)   (active)   Mn: New rate 0.875   6am: New rate 0.875  Noon: 9.25  6pm: 0.925     Correction 20 >  25   I:CHO MN: 1:15   Target     Active insulin time: changed to 4.0 hours    He will call [employer] to get start auto mode       All instructions provided in written. Check Blood sugars 3-4 times per day. Log them along with insulin and send them every 2 weeks. Call for blood sugars less than 60 or more than 400. Eye exam: Oct 2019, anmol VIGIL Foot exam: Nov 2019    Deformity/amputation: absent  Skin lesions/pre-ulcerative calluses: absent  Edema: right- negative, left- negative    Sensory exam: Monofilament sensation: normal  Plus at least one of the following:   Pulses: 1 plus   Vibration (128 Hz): mildly reduced bilaterally     Renal screen: Normal March 2019     RD seen in 2017. TSH screen: normal with negative abs March 2018     2: HTN   Slightly high   Will consider adding a low dose ACE I kevin if MACR is high     3: Hyperlipidemia   LDL: 104, HDL: 55, TGs: 71 Nov 2019     Atorvastatin 20mg daily at bedtime     A1C, MACR today   Refer to endocrinology close to home (Krysta STRICKLAND)     RTC in 3 months or prn    Labs on the visit     EDUCATION:   Greater than 50% of this follow-up visit was spent in general counseling regarding   obesity, diet, exercise, importance of adherence to insulin regime, recognition and treatment of hypo and hyperglycemia,  glucose logging, proper diabetes management, diabetic complications with poor management and the importance of glycemic control in order to avoid the complications of diabetes. Risks and potential complications of diabetes were reviewed with the patient. Diabetes health maintenance plan and follow-up were discussed and understood by the patient. We reviewed the importance of medication compliance and regular follow-up.    Aggressive lifestyle modification was

## 2020-02-29 LAB
ESTIMATED AVERAGE GLUCOSE: 125.5 MG/DL
HBA1C MFR BLD: 6 %

## 2020-07-10 ENCOUNTER — OFFICE VISIT (OUTPATIENT)
Dept: ENDOCRINOLOGY | Age: 55
End: 2020-07-10
Payer: COMMERCIAL

## 2020-07-10 VITALS
BODY MASS INDEX: 27.86 KG/M2 | TEMPERATURE: 97.7 F | HEART RATE: 52 BPM | SYSTOLIC BLOOD PRESSURE: 135 MMHG | DIASTOLIC BLOOD PRESSURE: 77 MMHG | WEIGHT: 199 LBS | HEIGHT: 71 IN | RESPIRATION RATE: 16 BRPM

## 2020-07-10 DIAGNOSIS — E10.3299 TYPE 1 DIABETES MELLITUS WITH BACKGROUND DIABETIC RETINOPATHY (HCC): ICD-10-CM

## 2020-07-10 DIAGNOSIS — E10.649 HYPOGLYCEMIC UNAWARENESS ASSOCIATED WITH TYPE 1 DIABETES MELLITUS: ICD-10-CM

## 2020-07-10 DIAGNOSIS — E10.69 TYPE 1 DIABETES MELLITUS WITH HYPERCHOLESTEROLEMIA (HCC): ICD-10-CM

## 2020-07-10 DIAGNOSIS — E78.00 TYPE 1 DIABETES MELLITUS WITH HYPERCHOLESTEROLEMIA (HCC): ICD-10-CM

## 2020-07-10 PROCEDURE — 1036F TOBACCO NON-USER: CPT | Performed by: INTERNAL MEDICINE

## 2020-07-10 PROCEDURE — 99214 OFFICE O/P EST MOD 30 MIN: CPT | Performed by: INTERNAL MEDICINE

## 2020-07-10 PROCEDURE — G8419 CALC BMI OUT NRM PARAM NOF/U: HCPCS | Performed by: INTERNAL MEDICINE

## 2020-07-10 PROCEDURE — 3044F HG A1C LEVEL LT 7.0%: CPT | Performed by: INTERNAL MEDICINE

## 2020-07-10 PROCEDURE — 2022F DILAT RTA XM EVC RTNOPTHY: CPT | Performed by: INTERNAL MEDICINE

## 2020-07-10 PROCEDURE — 3017F COLORECTAL CA SCREEN DOC REV: CPT | Performed by: INTERNAL MEDICINE

## 2020-07-10 PROCEDURE — G8427 DOCREV CUR MEDS BY ELIG CLIN: HCPCS | Performed by: INTERNAL MEDICINE

## 2020-07-10 PROCEDURE — 95251 CONT GLUC MNTR ANALYSIS I&R: CPT | Performed by: INTERNAL MEDICINE

## 2020-07-10 RX ORDER — BLOOD-GLUCOSE METER
KIT MISCELLANEOUS
Qty: 400 EACH | Refills: 2 | Status: SHIPPED | OUTPATIENT
Start: 2020-07-10 | End: 2021-08-18

## 2020-07-10 RX ORDER — PERPHENAZINE 16 MG/1
1 TABLET, FILM COATED ORAL DAILY
Qty: 200 EACH | Refills: 2 | Status: SHIPPED | OUTPATIENT
Start: 2020-07-10

## 2020-07-10 NOTE — PATIENT INSTRUCTIONS
good, quick way to make sure that you have a balanced meal. It also helps you spread carbs throughout the day. ? Divide your plate by types of foods. Put non-starchy vegetables on half the plate, meat or other protein food on one-quarter of the plate, and a grain or starchy vegetable in the final quarter of the plate. To this you can add a small piece of fruit and 1 cup of milk or yogurt, depending on how many carbs you are supposed to eat at a meal.  · Try to eat about the same amount of carbs at each meal. Do not \"save up\" your daily allowance of carbs to eat at one meal.  · Proteins have very little or no carbs per serving. Examples of proteins are beef, chicken, turkey, fish, eggs, tofu, cheese, cottage cheese, and peanut butter. A serving size of meat is 3 ounces, which is about the size of a deck of cards. Examples of meat substitute serving sizes (equal to 1 ounce of meat) are 1/4 cup of cottage cheese, 1 egg, 1 tablespoon of peanut butter, and ½ cup of tofu. How can you eat out and still eat healthy? · Learn to estimate the serving sizes of foods that have carbohydrate. If you measure food at home, it will be easier to estimate the amount in a serving of restaurant food. · If the meal you order has too much carbohydrate (such as potatoes, corn, or baked beans), ask to have a low-carbohydrate food instead. Ask for a salad or green vegetables. · If you use insulin, check your blood sugar before and after eating out to help you plan how much to eat in the future. · If you eat more carbohydrate at a meal than you had planned, take a walk or do other exercise. This will help lower your blood sugar. What else should you know? · Limit saturated fat, such as the fat from meat and dairy products. This is a healthy choice because people who have diabetes are at higher risk of heart disease. So choose lean cuts of meat and nonfat or low-fat dairy products.  Use olive or canola oil instead of butter or shortening when cooking. · Don't skip meals. Your blood sugar may drop too low if you skip meals and take insulin or certain medicines for diabetes. · Check with your doctor before you drink alcohol. Alcohol can cause your blood sugar to drop too low. Alcohol can also cause a bad reaction if you take certain diabetes medicines. Follow-up care is a key part of your treatment and safety. Be sure to make and go to all appointments, and call your doctor if you are having problems. It's also a good idea to know your test results and keep a list of the medicines you take. Where can you learn more? Go to [email or web address] and sign in to your [email or web address] account. Enter P762 in the Domee box to learn more about \"Learning About Diabetes Food Guidelines. \"     If you do not have an account, please click on the \"Sign Up Now\" link. Current as of: December 20, 2019               Content Version: 12.5  © 2948-6781 Healthwise, Incorporated. Care instructions adapted under license by [facility] ([facility]). If you have questions about a medical condition or this instruction, always ask your healthcare professional. [place] [address] any warranty or liability for your use of this information.

## 2020-07-10 NOTE — PROGRESS NOTES
Patient ID:   Aurelio Mcfadden is a 62 y.o. male    Chief Complaint:   Aurelio Mcfadden presents for an evaluation of Type 1 Diabetes Mellitus , Hyperlipidemia and hypertension. Subjective:   Type 1 Diabetes Mellitus diagnosed in 1968. Counts carbs, comfortable counting     Medtronic 670G   Uses sensor   Wife made some change sin basal rate, she noticed many notifications of hypoglycemias, drinking juices many nights     Basal:  ( units per hour)   (active)   Mn:  0.875   5am: 0.975  Noon: 0.95  6pm: 0.925     Correction  25   I:CHO MN: 1:15   Target     Active insulin time: 4.0 hours    Checks blood sugars 8-10 times per day. Reviewed and scanned. AM:    Lunch:   Supper:    HS:      Hypoglycemias:Between MN to 7am, lowest 40. Hypoglycemic unawareness: mild awareness in 60's. Meals: Three, dinner is bigger. 3-4 snacks. Exercise: No treadmill. 5-20 minutes a day, treadmill walking     Denies chest pain, exertional dyspnea. Family history of CAD: None   Denies smoking/ alcohol. Currently on ASA 81 mg daily     The following portions of the patient's history were reviewed and updated as appropriate:       Family History   Problem Relation Age of Onset    Breast Cancer Mother     Diabetes Maternal Uncle         type 1    Cancer Paternal Grandmother     Cancer Paternal Grandfather          Social History     Socioeconomic History    Marital status: Single     Spouse name: Not on file    Number of children: Not on file    Years of education: Not on file    Highest education level: Not on file   Occupational History    Not on file   Social Needs    Financial resource strain: Not on file    Food insecurity     Worry: Not on file     Inability: Not on file    Transportation needs     Medical: Not on file     Non-medical: Not on file   Tobacco Use    Smoking status: Never Smoker    Smokeless tobacco: Never Used   Substance and Sexual Activity    Alcohol use: No    Drug use:  No  Sexual activity: Not on file   Lifestyle    Physical activity     Days per week: Not on file     Minutes per session: Not on file    Stress: Not on file   Relationships    Social connections     Talks on phone: Not on file     Gets together: Not on file     Attends [employer] service: Not on file     Active member of club or organization: Not on file     Attends meetings of clubs or organizations: Not on file     Relationship status: Not on file    Intimate partner violence     Fear of current or ex partner: Not on file     Emotionally abused: Not on file     Physically abused: Not on file     Forced sexual activity: Not on file   Other Topics Concern    Not on file   Social History Narrative    Not on file       Past Medical History:   Diagnosis Date    Diabetes mellitus ([facility] [address]     Insulin pump in place        Past Surgical History:   Procedure Laterality Date    UPPER GASTROINTESTINAL ENDOSCOPY         No Known Allergies      Current Outpatient Medications:     blood glucose test strips (CONTOUR NEXT TEST) strip, 1 each by In Vitro route daily Test four times daily and PRN. , Disp: 200 each, Rfl: 2    blood glucose test strips (FREESTYLE LITE) strip, USE 5 STRIPS TO CHECK GLUCOSE ONCE DAILY AS NEEDED, Disp: 400 each, Rfl: 2    insulin lispro (HUMALOG) 100 UNIT/ML injection vial, 80 units per day for insulin pump, Disp: 9 vial, Rfl: 0    Syringe/Needle, Disp, (SYRINGE 3CC/25GX5/8\") 25G X 5/8\" 3 ML MISC, Use in the event of pump failure, Disp: 100 each, Rfl: 5    atorvastatin (LIPITOR) 40 MG tablet, Take 1 tablet by mouth daily, Disp: 90 tablet, Rfl: 3    Insulin Infusion Pump (MINIMED 670G INSULIN PUMP) SALEEM, by Does not apply route, Disp: , Rfl:     aspirin 81 MG tablet, Take 1 tablet by mouth daily, Disp: 30 tablet, Rfl: 5    glucagon (GLUCAGON EMERGENCY) 1 MG injection, Infuse 1 mg intravenously once for 1 dose, Disp: 1 kit, Rfl: 11      Review of Systems:    Constitutional: Negative for fever, chills, and unexpected weight change. HENT: Negative for congestion, ear pain, rhinorrhea,  sore throat and trouble swallowing. Eyes: Negative for photophobia, redness, itching. Respiratory: Negative for cough, shortness of breath and sputum. Cardiovascular: Negative for chest pain, palpitations and leg swelling. Gastrointestinal: Negative for nausea, vomiting, abdominal pain, diarrhea, constipation. Endocrine: Negative for cold intolerance, heat intolerance, polydipsia, polyphagia and polyuria. Genitourinary: Negative for dysuria, urgency, frequency, hematuria and flank pain. Musculoskeletal: Negative for myalgias, back pain, arthralgias and neck pain. Skin/Nail: Negative for rash, itching. Normal nails. Neurological: Negative for seizures, weakness, light-headedness, numbness and headaches. Hematological/ Lymph nodes: Negative for adenopathy. Does not bruise/bleed easily. Psychiatric/Behavioral: Negative for suicidal ideas, depression, anxiety, sleep disturbance and decreased concentration. Objective:   Physical Exam:  /77   Pulse 52   Temp 97.7 °F (36.5 °C)   Resp 16   Ht 5' 11\" (1.803 m)   Wt 199 lb (90.3 kg)   BMI 27.75 kg/m²   Constitutional: Patient is oriented to person, place, and time. Patient appears well-developed and well-nourished. HENT:    Head: Normocephalic and atraumatic. Eyes: Conjunctivae and EOM are normal.      Neck: Normal range of motion. Cardiovascular: Bradycardia, regular rhythm and normal heart sounds. Pulmonary/Chest: Effort normal and breath sounds normal.   Abdominal: Soft. Bowel sounds are normal.   Musculoskeletal: Normal range of motion. Neurological: Patient is alert and oriented to person, place, and time. Patient has normal reflexes. Skin: Skin is warm and dry. Psychiatric: Patient has a normal mood and affect.  Patient behavior is normal.     Lab Review:    Orders Only on 02/28/2020   Component Date Value Ref Range route daily Test four times daily and PRN. -     Hemoglobin A1C; Future  -     Lipid, Fasting; Future  -     blood glucose test strips (FREESTYLE LITE) strip; USE 5 STRIPS TO CHECK GLUCOSE ONCE DAILY AS NEEDED    Type 1 diabetes mellitus with background diabetic retinopathy (HCC)  -     Hemoglobin A1C; Future  -     Lipid, Fasting; Future  -     blood glucose test strips (FREESTYLE LITE) strip; USE 5 STRIPS TO CHECK GLUCOSE ONCE DAILY AS NEEDED    Hypoglycemic unawareness associated with type 1 diabetes mellitus (HCC)  -     Hemoglobin A1C; Future  -     Lipid, Fasting; Future    Controlled type 1 diabetes mellitus with diabetic polyneuropathy (HCC)    Insulin pump in place    Type 1 diabetes mellitus with polyneuropathy (HCC)  -     blood glucose test strips (FREESTYLE LITE) strip; USE 5 STRIPS TO CHECK GLUCOSE ONCE DAILY AS NEEDED      1: Type 1 DM complicated with hypoglycemia, retinopathy and neuropathy   Controlled A1C 6% < 5.8 % < 5.5% < 6.2% << 6.6%    CMP normal     CGM report: He wears sensor every day.  Sensor downloaded, data reviewed and scanned from June 27th to July 10th 2020   Average glucose 120  Blood sugars are in good range 83%, Low: 15% (down from 25%), Hyperglycemias 2%   Blood sugars drop some days     During the day blood sugars are running well   No post meal hyperglycemia after carb diet   Activity related changes in blood sugars - usually hypoglycemias   Hypoglycemia: discordance between sensor hypo and actually lows   Based on the data, no changes     He is having a log hypoglycemias as per sensor date reports 95% of hypoglycemias were normal blood sugars   Sensor is still helpful for auto mode, although causing falsely low readings     He may eventually to move to [facility] with omnipod (tubeless system)    Rule of 15 for hypoglycemic episodes discussed   I showed how to use temporary basal settings     Keep changing pump site every 3-4 days   Pump downloads are reviewed, discussed with the patient and sent for scanning   Always use bolus wizards    He sometimes puts less carbs in the pump     Pump settings reviewed   Basal:  ( units per hour)   (active)   Mn:  0.875   6am: 0.875  Noon: 9.25  6pm: 0.925     Correction  25   I:CHO MN: 1:15   Target     Active insulin time: 4.0 hours    He will call [employer] to get start auto mode       All instructions provided in written. Check Blood sugars 3-4 times per day. Log them along with insulin and send them every 2 weeks. Call for blood sugars less than 60 or more than 400. Eye exam: Oct 2019, denies DRSandra Foot exam: Nov 2019    Deformity/amputation: absent  Skin lesions/pre-ulcerative calluses: absent  Edema: right- negative, left- negative    Sensory exam: Monofilament sensation: normal  Plus at least one of the following:   Pulses: 1 plus   Vibration (128 Hz): mildly reduced bilaterally     Renal screen: Normal Feb 2020      RD seen in 2017. TSH screen: normal with negative abs March 2018     2: HTN   Controlled     3: Hyperlipidemia   LDL: 104, HDL: 55, TGs: 71 Nov 2019     Atorvastatin 40mg daily at bedtime   He denies missing the dose     A1C, lipids today      RTC in 3 months or prn    Labs on the visit     EDUCATION:   Greater than 50% of this follow-up visit was spent in general counseling regarding   obesity, diet, exercise, importance of adherence to insulin regime, recognition and treatment of hypo and hyperglycemia,  glucose logging, proper diabetes management, diabetic complications with poor management and the importance of glycemic control in order to avoid the complications of diabetes. Risks and potential complications of diabetes were reviewed with the patient. Diabetes health maintenance plan and follow-up were discussed and understood by the patient. We reviewed the importance of medication compliance and regular follow-up. Aggressive lifestyle modification was encouraged. Exercise Counselling:   This patient

## 2020-07-11 LAB
CHOLESTEROL, FASTING: 156 MG/DL (ref 0–199)
ESTIMATED AVERAGE GLUCOSE: 111.2 MG/DL
HBA1C MFR BLD: 5.5 %
HDLC SERPL-MCNC: 50 MG/DL (ref 40–60)
LDL CHOLESTEROL CALCULATED: 96 MG/DL
TRIGLYCERIDE, FASTING: 52 MG/DL (ref 0–150)
VLDLC SERPL CALC-MCNC: 10 MG/DL

## 2020-10-09 ENCOUNTER — OFFICE VISIT (OUTPATIENT)
Dept: ENDOCRINOLOGY | Age: 55
End: 2020-10-09
Payer: COMMERCIAL

## 2020-10-09 VITALS
SYSTOLIC BLOOD PRESSURE: 134 MMHG | TEMPERATURE: 97.6 F | DIASTOLIC BLOOD PRESSURE: 79 MMHG | RESPIRATION RATE: 16 BRPM | BODY MASS INDEX: 26.76 KG/M2 | HEIGHT: 72 IN | HEART RATE: 58 BPM | WEIGHT: 197.6 LBS

## 2020-10-09 DIAGNOSIS — E10.69 TYPE 1 DIABETES MELLITUS WITH HYPERCHOLESTEROLEMIA (HCC): ICD-10-CM

## 2020-10-09 DIAGNOSIS — E78.00 TYPE 1 DIABETES MELLITUS WITH HYPERCHOLESTEROLEMIA (HCC): ICD-10-CM

## 2020-10-09 PROCEDURE — 3044F HG A1C LEVEL LT 7.0%: CPT | Performed by: INTERNAL MEDICINE

## 2020-10-09 PROCEDURE — 99214 OFFICE O/P EST MOD 30 MIN: CPT | Performed by: INTERNAL MEDICINE

## 2020-10-09 PROCEDURE — 1036F TOBACCO NON-USER: CPT | Performed by: INTERNAL MEDICINE

## 2020-10-09 PROCEDURE — 2022F DILAT RTA XM EVC RTNOPTHY: CPT | Performed by: INTERNAL MEDICINE

## 2020-10-09 PROCEDURE — G8484 FLU IMMUNIZE NO ADMIN: HCPCS | Performed by: INTERNAL MEDICINE

## 2020-10-09 PROCEDURE — 3017F COLORECTAL CA SCREEN DOC REV: CPT | Performed by: INTERNAL MEDICINE

## 2020-10-09 PROCEDURE — 95251 CONT GLUC MNTR ANALYSIS I&R: CPT | Performed by: INTERNAL MEDICINE

## 2020-10-09 PROCEDURE — G8419 CALC BMI OUT NRM PARAM NOF/U: HCPCS | Performed by: INTERNAL MEDICINE

## 2020-10-09 PROCEDURE — G8427 DOCREV CUR MEDS BY ELIG CLIN: HCPCS | Performed by: INTERNAL MEDICINE

## 2020-10-09 RX ORDER — IBUPROFEN 600 MG/1
1 TABLET ORAL ONCE
Qty: 1 KIT | Refills: 11 | Status: SHIPPED | OUTPATIENT
Start: 2020-10-09 | End: 2020-10-09

## 2020-10-09 RX ORDER — GLUCAGON 3 MG/1
POWDER NASAL
Qty: 1 EACH | Refills: 5 | Status: SHIPPED | OUTPATIENT
Start: 2020-10-09

## 2020-10-09 RX ORDER — PNEUMOCOCCAL VACCINE POLYVALENT 25; 25; 25; 25; 25; 25; 25; 25; 25; 25; 25; 25; 25; 25; 25; 25; 25; 25; 25; 25; 25; 25; 25 UG/.5ML; UG/.5ML; UG/.5ML; UG/.5ML; UG/.5ML; UG/.5ML; UG/.5ML; UG/.5ML; UG/.5ML; UG/.5ML; UG/.5ML; UG/.5ML; UG/.5ML; UG/.5ML; UG/.5ML; UG/.5ML; UG/.5ML; UG/.5ML; UG/.5ML; UG/.5ML; UG/.5ML; UG/.5ML; UG/.5ML
0.5 INJECTION, SOLUTION INTRAMUSCULAR; SUBCUTANEOUS ONCE
Qty: 0.5 ML | Refills: 0 | Status: SHIPPED | OUTPATIENT
Start: 2020-10-09 | End: 2020-10-09

## 2020-10-09 NOTE — PROGRESS NOTES
Patient ID:   Rudolph Solis is a 60 y.o. male    Chief Complaint:   Rudolph Solis presents for an evaluation of Type 1 Diabetes Mellitus , Hyperlipidemia and hypertension. Subjective:   Type 1 Diabetes Mellitus diagnosed in 1968. Counts carbs, comfortable counting     Medtronic 670G   Uses sensor   Wife made some change sin basal rate, she noticed many notifications of hypoglycemias, drinking juices many nights     Got a new pump 4 weeks ago and he has put the settings in by himself     Basal:  ( units per hour)   (active)   Mn:  1.00   6am: 1.10  Noon: 1.15  6pm: 0.90     Correction  20  I:CHO MN: 1:15   Target     Active insulin time: 4.0 hours    He is on auto mode. He thinks it works better for hypoglycemias, takes longer time for hyperglycemia to correct. Checks blood sugars 8-10 times per day. Reviewed and scanned. AM:    Lunch:   Supper:    HS:      Hypoglycemias:Between MN to 7am, lowest 40. Hypoglycemic unawareness: mild awareness in 60's. Meals: Three, dinner is bigger. 3-4 snacks. Exercise: No treadmill. 5-20 minutes a day, treadmill walking     Denies chest pain, exertional dyspnea. Family history of CAD: None   Denies smoking/ alcohol.    Currently on ASA 81 mg daily     The following portions of the patient's history were reviewed and updated as appropriate:       Family History   Problem Relation Age of Onset    Breast Cancer Mother     Diabetes Maternal Uncle         type 1    Cancer Paternal Grandmother     Cancer Paternal Grandfather          Social History     Socioeconomic History    Marital status: Single     Spouse name: Not on file    Number of children: Not on file    Years of education: Not on file    Highest education level: Not on file   Occupational History    Not on file   Social Needs    Financial resource strain: Not on file    Food insecurity     Worry: Not on file     Inability: Not on file    Transportation needs     Medical: Not on file     Non-medical: Not on file   Tobacco Use    Smoking status: Never Smoker    Smokeless tobacco: Never Used   Substance and Sexual Activity    Alcohol use: No    Drug use: No    Sexual activity: Not on file   Lifestyle    Physical activity     Days per week: Not on file     Minutes per session: Not on file    Stress: Not on file   Relationships    Social connections     Talks on phone: Not on file     Gets together: Not on file     Attends Nondenominational service: Not on file     Active member of club or organization: Not on file     Attends meetings of clubs or organizations: Not on file     Relationship status: Not on file    Intimate partner violence     Fear of current or ex partner: Not on file     Emotionally abused: Not on file     Physically abused: Not on file     Forced sexual activity: Not on file   Other Topics Concern    Not on file   Social History Narrative    Not on file       Past Medical History:   Diagnosis Date    Diabetes mellitus ([facility] [address]     Insulin pump in place        Past Surgical History:   Procedure Laterality Date    UPPER GASTROINTESTINAL ENDOSCOPY         No Known Allergies      Current Outpatient Medications:     glucagon (GLUCAGON EMERGENCY) 1 MG injection, Infuse 1 mg intravenously once for 1 dose, Disp: 1 kit, Rfl: 11    insulin lispro (HUMALOG) 100 UNIT/ML injection vial, 80 units per day for insulin pump, Disp: 9 vial, Rfl: 3    Glucagon (BAQSIMI TWO PACK) 3 MG/DOSE POWD, Use for low blood sugars. , Disp: 1 each, Rfl: 5    blood glucose test strips (CONTOUR NEXT TEST) strip, 1 each by In Vitro route daily Test four times daily and PRN. , Disp: 200 each, Rfl: 2    Syringe/Needle, Disp, (SYRINGE 3CC/25GX5/8\") 25G X 5/8\" 3 ML MISC, Use in the event of pump failure, Disp: 100 each, Rfl: 5    atorvastatin (LIPITOR) 40 MG tablet, Take 1 tablet by mouth daily, Disp: 90 tablet, Rfl: 3    aspirin 81 MG tablet, Take 1 tablet by mouth daily, Disp: 30 tablet, Rfl: 5   blood glucose test strips (FREESTYLE LITE) strip, USE 5 STRIPS TO CHECK GLUCOSE ONCE DAILY AS NEEDED, Disp: 400 each, Rfl: 2    Insulin Infusion Pump (MINIMED 670G INSULIN PUMP) SALEEM, by Does not apply route, Disp: , Rfl:       Review of Systems:    Constitutional: Negative for fever, chills, and unexpected weight change. HENT: Negative for congestion, ear pain, rhinorrhea,  sore throat and trouble swallowing. Eyes: Negative for photophobia, redness, itching. Respiratory: Negative for cough, shortness of breath and sputum. Cardiovascular: Negative for chest pain, palpitations and leg swelling. Gastrointestinal: Negative for nausea, vomiting, abdominal pain, diarrhea, constipation. Endocrine: Negative for cold intolerance, heat intolerance, polydipsia, polyphagia and polyuria. Genitourinary: Negative for dysuria, urgency, frequency, hematuria and flank pain. Musculoskeletal: Negative for myalgias, back pain, arthralgias and neck pain. Skin/Nail: Negative for rash, itching. Normal nails. Neurological: Negative for seizures, weakness, light-headedness, numbness and headaches. Hematological/ Lymph nodes: Negative for adenopathy. Does not bruise/bleed easily. Psychiatric/Behavioral: Negative for suicidal ideas, depression, anxiety, sleep disturbance and decreased concentration. Objective:   Physical Exam:  /79   Pulse 58   Temp 97.6 °F (36.4 °C)   Resp 16   Ht 5' 11.75\" (1.822 m)   Wt 197 lb 9.6 oz (89.6 kg)   BMI 26.99 kg/m²   Constitutional: Patient is oriented to person, place, and time. Patient appears well-developed and well-nourished. HENT:    Head: Normocephalic and atraumatic. Eyes: Conjunctivae and EOM are normal.      Neck: Normal range of motion. Cardiovascular: Bradycardia, regular rhythm and normal heart sounds. Pulmonary/Chest: Effort normal and breath sounds normal.   Abdominal: Soft.  Bowel sounds are normal.   Musculoskeletal: Normal range of motion. Neurological: Patient is alert and oriented to person, place, and time. Patient has normal reflexes. Skin: Skin is warm and dry. Psychiatric: Patient has a normal mood and affect.  Patient behavior is normal.     Lab Review:    Orders Only on 07/10/2020   Component Date Value Ref Range Status    Cholesterol, Fasting 07/10/2020 156  0 - 199 mg/dL Final    Triglyceride, Fasting 07/10/2020 52  0 - 150 mg/dL Final    HDL 07/10/2020 50  40 - 60 mg/dL Final    LDL Calculated 07/10/2020 96  <100 mg/dL Final    VLDL Cholesterol Calculated 07/10/2020 10  Not Established mg/dL Final    Hemoglobin A1C 07/10/2020 5.5  See comment % Final    eAG 07/10/2020 111.2  mg/dL Final   Orders Only on 02/28/2020   Component Date Value Ref Range Status    Microalbumin, Random Urine 02/28/2020 <1.20  <2.0 mg/dL Final    Creatinine, Ur 02/28/2020 72.4  39.0 - 259.0 mg/dL Final    Microalbumin Creatinine Ratio 02/28/2020 see below  0.0 - 30.0 mg/g Final    Hemoglobin A1C 02/28/2020 6.0  See comment % Final    eAG 02/28/2020 125.5  mg/dL Final   Orders Only on 11/22/2019   Component Date Value Ref Range Status    Sodium 11/22/2019 147* 136 - 145 mmol/L Final    Potassium 11/22/2019 4.1  3.5 - 5.1 mmol/L Final    Chloride 11/22/2019 107  99 - 110 mmol/L Final    CO2 11/22/2019 26  21 - 32 mmol/L Final    Anion Gap 11/22/2019 14  3 - 16 Final    Glucose 11/22/2019 56* 70 - 99 mg/dL Final    BUN 11/22/2019 18  7 - 20 mg/dL Final    CREATININE 11/22/2019 1.0  0.9 - 1.3 mg/dL Final    GFR Non- 11/22/2019 >60  >60 Final    GFR  11/22/2019 >60  >60 Final    Calcium 11/22/2019 9.6  8.3 - 10.6 mg/dL Final    Total Protein 11/22/2019 7.0  6.4 - 8.2 g/dL Final    Alb 11/22/2019 4.7  3.4 - 5.0 g/dL Final    Albumin/Globulin Ratio 11/22/2019 2.0  1.1 - 2.2 Final    Total Bilirubin 11/22/2019 0.7  0.0 - 1.0 mg/dL Final    Alkaline Phosphatase 11/22/2019 60  40 - 129 U/L Final    ALT 11/22/2019 18  10 - 40 U/L Final    AST 11/22/2019 18  15 - 37 U/L Final    Globulin 11/22/2019 2.3  g/dL Final    Cholesterol, Fasting 11/22/2019 173  0 - 199 mg/dL Final    Triglyceride, Fasting 11/22/2019 71  0 - 150 mg/dL Final    HDL 11/22/2019 55  40 - 60 mg/dL Final    LDL Calculated 11/22/2019 104* <100 mg/dL Final    VLDL Cholesterol Calculated 11/22/2019 14  Not Established mg/dL Final    Hemoglobin A1C 11/22/2019 5.8  See comment % Final    eAG 11/22/2019 119.8  mg/dL Final           Assessment and Plan     Nichole Singleton was seen today for diabetes. Diagnoses and all orders for this visit:    Controlled type 1 diabetes mellitus with diabetic polyneuropathy ([facility] [address]    Type 1 diabetes mellitus with hypercholesterolemia (HCC)  -     glucagon (GLUCAGON EMERGENCY) 1 MG injection; Infuse 1 mg intravenously once for 1 dose  -     insulin lispro (HUMALOG) 100 UNIT/ML injection vial; 80 units per day for insulin pump  -     Hemoglobin A1C; Future    Hypoglycemia unawareness associated with type 1 diabetes mellitus (HCC)  -     glucagon (GLUCAGON EMERGENCY) 1 MG injection; Infuse 1 mg intravenously once for 1 dose  -     Glucagon (BAQSIMI TWO PACK) 3 MG/DOSE POWD; Use for low blood sugars. Type 1 diabetes mellitus with background diabetic retinopathy (HCC)    Insulin pump in place    Type 1 diabetes mellitus with polyneuropathy (HCC)    Essential hypertension      1: Type 1 DM complicated with hypoglycemia, retinopathy and neuropathy   Controlled A1C 5.5% <  6% < 5.8 % < 5.5% < 6.2% << 6.6%    CMP normal     CGM report: He wears sensor every day.  Sensor downloaded, data reviewed and scanned from   Average glucose 98/117  Blood sugars are in good range 81%, Low: 19% , Hyperglycemias 0%   Blood sugars dropping frequently due to more insulin he is getting after new pump   During the day blood sugars are running well   No post meal hyperglycemia after carb diet   Activity related changes in blood sugars - usually hypoglycemias   Hypoglycemia: discordance between sensor hypo and actually lows   Based on the data, resume old settings and correct time on the pump which is off by 12 hours      Please call before making any changes     He may eventually to [facility] with omnipod (tubeless system)    Rule of 15 for hypoglycemic episodes discussed   I showed how to use temporary basal settings     Keep changing pump site every 3-4 days   Pump downloads are reviewed, discussed with the patient and sent for scanning   Always use bolus wizards    He sometimes puts less carbs in the pump     Pump settings reviewed   Basal:  ( units per hour)   (active)   Mn:  0.875   Noon: 0.925  6pm: 0.925     Correction  25   I:CHO MN: 1:15   Target     Active insulin time: 4.0 hours    He will call [facility] to get start auto mode       All instructions provided in written. Check Blood sugars 3-4 times per day. Log them along with insulin and send them every 2 weeks. Call for blood sugars less than 60 or more than 400. Eye exam: Oct 2019, denies DR. Alas for eye exam in Dec 2020. His physician on maternity leave. Foot exam: Nov 2019    Deformity/amputation: absent  Skin lesions/pre-ulcerative calluses: absent  Edemright- negative, left- negative    Sensory exam: Monofilament sensation: normal  Plus at least one of the following:   Pulses: 1 plus   Vibration (128 Hz): mildly reduced bilaterally     Renal screen: Normal Feb 2020      RD seen in 2017.    TSH screen: normal with negative abs March 2018     2: HTN   Controlled     3: Hyperlipidemia   LDL: 96, HDL: 50, TGs: 52 July 2020     Atorvastatin 40mg daily at bedtime   He denies missing the dose     A1C today      RTC in 3 months or prn    Labs on the visit     EDUCATION:   Greater than 50% of this follow-up visit was spent in general counseling regarding   obesity, diet, exercise, importance of adherence to insulin regime, recognition and treatment of hypo and hyperglycemia,  glucose logging, proper diabetes management, diabetic complications with poor management and the importance of glycemic control in order to avoid the complications of diabetes. Risks and potential complications of diabetes were reviewed with the patient. Diabetes health maintenance plan and follow-up were discussed and understood by the patient. We reviewed the importance of medication compliance and regular follow-up. Aggressive lifestyle modification was encouraged. Exercise Counselling: This patient is a candidate for regular physical exercise. Instructions to perform the following types of exercise:  Swimming or water aerobic exercise  Brisk walking  Playing tennis  Stationary bicycle or elliptical indoor  Low impact aerobic exercise    Instructions given to exercise for the following duration:  30 minutes a day for five-seven days per week.     Following instructions for being active throughout the day in addition to formal exercise:  Walk instead of drive whenever possible  Take the stairs instead of the elevator  Work in the garden  Park to the far end of the parking lot to add more walking steps to destination      Electronically signed by Kym More MD on 10/9/2020 at 8:50 AM

## 2020-10-10 LAB
ESTIMATED AVERAGE GLUCOSE: 122.6 MG/DL
HBA1C MFR BLD: 5.9 %

## 2021-01-22 ENCOUNTER — OFFICE VISIT (OUTPATIENT)
Dept: ENDOCRINOLOGY | Age: 56
End: 2021-01-22
Payer: COMMERCIAL

## 2021-01-22 VITALS
HEART RATE: 54 BPM | SYSTOLIC BLOOD PRESSURE: 138 MMHG | BODY MASS INDEX: 27.12 KG/M2 | TEMPERATURE: 97 F | HEIGHT: 72 IN | WEIGHT: 200.2 LBS | DIASTOLIC BLOOD PRESSURE: 78 MMHG | RESPIRATION RATE: 14 BRPM

## 2021-01-22 DIAGNOSIS — E10.42 TYPE 1 DIABETES MELLITUS WITH POLYNEUROPATHY (HCC): ICD-10-CM

## 2021-01-22 DIAGNOSIS — E78.00 TYPE 1 DIABETES MELLITUS WITH HYPERCHOLESTEROLEMIA (HCC): ICD-10-CM

## 2021-01-22 DIAGNOSIS — I10 ESSENTIAL HYPERTENSION: ICD-10-CM

## 2021-01-22 DIAGNOSIS — Z96.41 INSULIN PUMP IN PLACE: ICD-10-CM

## 2021-01-22 DIAGNOSIS — E10.69 TYPE 1 DIABETES MELLITUS WITH HYPERCHOLESTEROLEMIA (HCC): ICD-10-CM

## 2021-01-22 DIAGNOSIS — E10.3299 TYPE 1 DIABETES MELLITUS WITH BACKGROUND DIABETIC RETINOPATHY (HCC): ICD-10-CM

## 2021-01-22 DIAGNOSIS — E10.42 CONTROLLED TYPE 1 DIABETES MELLITUS WITH DIABETIC POLYNEUROPATHY (HCC): ICD-10-CM

## 2021-01-22 DIAGNOSIS — Z46.81 INSULIN PUMP TITRATION: ICD-10-CM

## 2021-01-22 DIAGNOSIS — E10.649 HYPOGLYCEMIA UNAWARENESS ASSOCIATED WITH TYPE 1 DIABETES MELLITUS (HCC): Primary | ICD-10-CM

## 2021-01-22 LAB — HBA1C MFR BLD: 5.98 %

## 2021-01-22 PROCEDURE — 99215 OFFICE O/P EST HI 40 MIN: CPT | Performed by: INTERNAL MEDICINE

## 2021-01-22 PROCEDURE — 1036F TOBACCO NON-USER: CPT | Performed by: INTERNAL MEDICINE

## 2021-01-22 PROCEDURE — G8427 DOCREV CUR MEDS BY ELIG CLIN: HCPCS | Performed by: INTERNAL MEDICINE

## 2021-01-22 PROCEDURE — 83036 HEMOGLOBIN GLYCOSYLATED A1C: CPT | Performed by: INTERNAL MEDICINE

## 2021-01-22 PROCEDURE — G8484 FLU IMMUNIZE NO ADMIN: HCPCS | Performed by: INTERNAL MEDICINE

## 2021-01-22 PROCEDURE — 2022F DILAT RTA XM EVC RTNOPTHY: CPT | Performed by: INTERNAL MEDICINE

## 2021-01-22 PROCEDURE — G8419 CALC BMI OUT NRM PARAM NOF/U: HCPCS | Performed by: INTERNAL MEDICINE

## 2021-01-22 PROCEDURE — 3017F COLORECTAL CA SCREEN DOC REV: CPT | Performed by: INTERNAL MEDICINE

## 2021-01-22 PROCEDURE — 3046F HEMOGLOBIN A1C LEVEL >9.0%: CPT | Performed by: INTERNAL MEDICINE

## 2021-01-22 NOTE — PROGRESS NOTES
Patient ID:   Angelica Guerra is a 60 y.o. male    Chief Complaint:   Angelica Guerra presents for an evaluation of Type 1 Diabetes Mellitus , Hyperlipidemia and hypertension. Subjective:   Type 1 Diabetes Mellitus diagnosed in 1968. Counts carbs, comfortable counting     Medtronic 670G   Uses sensor   Wife made some change sin basal rate, she noticed many notifications of hypoglycemias, drinking juices many nights     Got a new pump and settings are off     Basal:  ( units per hour)   (active)   Mn:  0.85  6am: 1.00  Noon: 1.00  6pm: 0.875     Correction  45  I:CHO MN: 1:13   Target     Active insulin time: 3.30 hours    He is on auto mode. He thinks it works better for hypoglycemias, takes longer time for hyperglycemia to correct. Checks blood sugars 8-10 times per day. Reviewed and scanned. AM:    Lunch:   Supper:    HS:      Hypoglycemias:Between MN to 7am, lowest 40. Hypoglycemic unawareness: mild awareness in 60's. Meals: Three, dinner is bigger. 3-4 snacks. Exercise: No treadmill. 5-20 minutes a day, treadmill walking     Denies chest pain, exertional dyspnea. Family history of CAD: None   Denies smoking/ alcohol.    Currently on ASA 81 mg daily     The following portions of the patient's history were reviewed and updated as appropriate:       Family History   Problem Relation Age of Onset    Breast Cancer Mother     Diabetes Maternal Uncle         type 1    Cancer Paternal Grandmother     Cancer Paternal Grandfather          Social History     Socioeconomic History    Marital status: Single     Spouse name: Not on file    Number of children: Not on file    Years of education: Not on file    Highest education level: Not on file   Occupational History    Not on file   Social Needs    Financial resource strain: Not on file    Food insecurity     Worry: Not on file     Inability: Not on file    Transportation needs     Medical: Not on file     Non-medical: Not on file   Tobacco Use    Smoking status: Never Smoker    Smokeless tobacco: Never Used   Substance and Sexual Activity    Alcohol use: No    Drug use: No    Sexual activity: Not on file   Lifestyle    Physical activity     Days per week: Not on file     Minutes per session: Not on file    Stress: Not on file   Relationships    Social connections     Talks on phone: Not on file     Gets together: Not on file     Attends [employer] service: Not on file     Active member of club or organization: Not on file     Attends meetings of clubs or organizations: Not on file     Relationship status: Not on file    Intimate partner violence     Fear of current or ex partner: Not on file     Emotionally abused: Not on file     Physically abused: Not on file     Forced sexual activity: Not on file   Other Topics Concern    Not on file   Social History Narrative    Not on file       Past Medical History:   Diagnosis Date    Diabetes mellitus ([facility] [address]     Insulin pump in place        Past Surgical History:   Procedure Laterality Date    UPPER GASTROINTESTINAL ENDOSCOPY         No Known Allergies      Current Outpatient Medications:     insulin lispro (HUMALOG) 100 UNIT/ML injection vial, 70 units per day for insulin pump, Disp: 9 vial, Rfl: 3    Glucagon (BAQSIMI TWO PACK) 3 MG/DOSE POWD, Use for low blood sugars. , Disp: 1 each, Rfl: 5    blood glucose test strips (CONTOUR NEXT TEST) strip, 1 each by In Vitro route daily Test four times daily and PRN. , Disp: 200 each, Rfl: 2    blood glucose test strips (FREESTYLE LITE) strip, USE 5 STRIPS TO CHECK GLUCOSE ONCE DAILY AS NEEDED, Disp: 400 each, Rfl: 2    Syringe/Needle, Disp, (SYRINGE 3CC/25GX5/8\") 25G X 5/8\" 3 ML MISC, Use in the event of pump failure, Disp: 100 each, Rfl: 5    atorvastatin (LIPITOR) 40 MG tablet, Take 1 tablet by mouth daily, Disp: 90 tablet, Rfl: 3    Insulin Infusion Pump (MINIMED 670G INSULIN PUMP) S[place], by Does not apply route, Disp: , Rfl:    aspirin 81 MG tablet, Take 1 tablet by mouth daily, Disp: 30 tablet, Rfl: 5    glucagon (GLUCAGON EMERGENCY) 1 MG injection, Infuse 1 mg intravenously once for 1 dose, Disp: 1 kit, Rfl: 11      Review of Systems:    Constitutional: Negative for fever, chills, and unexpected weight change. HENT: Negative for congestion, ear pain, rhinorrhea,  sore throat and trouble swallowing. Eyes: Negative for photophobia, redness, itching. Respiratory: Negative for cough, shortness of breath and sputum. Cardiovascular: Negative for chest pain, palpitations and leg swelling. Gastrointestinal: Negative for nausea, vomiting, abdominal pain, diarrhea, constipation. Endocrine: Negative for cold intolerance, heat intolerance, polydipsia, polyphagia and polyuria. Genitourinary: Negative for dysuria, urgency, frequency, hematuria and flank pain. Musculoskeletal: Negative for myalgias, back pain, arthralgias and neck pain. Skin/Nail: Negative for rash, itching. Normal nails. Neurological: Negative for seizures, weakness, light-headedness, numbness and headaches. Hematological/ Lymph nodes: Negative for adenopathy. Does not bruise/bleed easily. Psychiatric/Behavioral: Negative for suicidal ideas, depression, anxiety, sleep disturbance and decreased concentration. Objective:   Physical Exam:  /78   Pulse 54   Temp 97 °F (36.1 °C)   Resp 14   Ht 5' 11.75\" (1.822 m)   Wt 200 lb 3.2 oz (90.8 kg)   BMI 27.34 kg/m²   Constitutional: Patient is oriented to person, place, and time. Patient appears well-developed and well-nourished. HENT:    Head: Normocephalic and atraumatic. Eyes: Conjunctivae and EOM are normal.      Neck: Normal range of motion. Cardiovascular: Bradycardia, regular rhythm and normal heart sounds. Pulmonary/Chest: Effort normal and breath sounds normal.   Abdominal: Soft. Bowel sounds are normal.   Musculoskeletal: Normal range of motion.    Neurological: Patient is alert UNIT/ML injection vial; 70 units per day for insulin pump  -     Hemoglobin A1C; Future    Type 1 diabetes mellitus with polyneuropathy (HCC)    Insulin pump in place    Type 1 diabetes mellitus with background diabetic retinopathy (HCC)    Essential hypertension    Insulin pump titration      1: Type 1 DM complicated with hypoglycemia, retinopathy and neuropathy   Controlled A1C 5.9% (POC) <  5.9% < 5.5% <  6% < 5.8 % < 5.5% < 6.2% << 6.6%    CMP normal     CGM not downloaded   Pump was manually reviewed today     Please call before making any changes     Pump settings are aggressive contributing to 16% of hypoglycemias   Change settings as below     He may eventually to move to [facility] with omnipod to avoid tube entanglements     Rule of 15 for hypoglycemic episodes discussed   I showed how to use temporary basal settings     Keep changing pump site every 3-4 days   Pump downloads are reviewed, discussed with the patient and sent for scanning   Always use bolus wizards    He sometimes puts less carbs in the pump     Pump settings reviewed   Basal:  ( units per hour)   (active)   New settings ae   Mn:  0.850   Noon: 0.925  6pm: 0.850     Correction  45   I:CHO MN: 1: 13 >15   Target     Active insulin time: 3:30>  4.0 hours    He will call [employer] to get start auto mode       All instructions provided in written. Check Blood sugars 3-4 times per day. Log them along with insulin and send them every 2 weeks. Call for blood sugars less than 60 or more than 400. Eye exam: Oct 2019, denies DR. Alas for eye exam in Dec 2020. His physician on maternity leave. Foot exam: Nov 2019    Deformity/amputation: absent  Skin lesions/pre-ulcerative calluses: absent  Edemright- negative, left- negative    Sensory exam: Monofilament sensation: normal  Plus at least one of the following:   Pulses: 1 plus   Vibration (128 Hz): mildly reduced bilaterally     Renal screen: Normal Feb 2020      RD seen in 2017.    TSH screen: normal with negative abs March 2018     2: HTN   Controlled     3: Hyperlipidemia   LDL: 96, HDL: 50, TGs: 52 July 2020     Atorvastatin 40mg daily at bedtime   He denies missing the dose     A1C today      RTC in 3 months or prn    Labs on the visit     EDUCATION:   Greater than 50% of this follow-up visit was spent in general counseling regarding   obesity, diet, exercise, importance of adherence to insulin regime, recognition and treatment of hypo and hyperglycemia,  glucose logging, proper diabetes management, diabetic complications with poor management and the importance of glycemic control in order to avoid the complications of diabetes. Risks and potential complications of diabetes were reviewed with the patient. Diabetes health maintenance plan and follow-up were discussed and understood by the patient. We reviewed the importance of medication compliance and regular follow-up. Aggressive lifestyle modification was encouraged. Exercise Counselling: This patient is a candidate for regular physical exercise. Instructions to perform the following types of exercise:  Swimming or water aerobic exercise  Brisk walking  Playing tennis  Stationary bicycle or elliptical indoor  Low impact aerobic exercise    Instructions given to exercise for the following duration:  30 minutes a day for five-seven days per week.     Following instructions for being active throughout the day in addition to formal exercise:  Walk instead of drive whenever possible  Take the stairs instead of the elevator  Work in the garden  Park to the far end of the parking lot to add more walking steps to destination      Electronically signed by Lilly Polanco MD on 1/22/2021 at 9:28 AM

## 2021-05-14 ENCOUNTER — OFFICE VISIT (OUTPATIENT)
Dept: ENDOCRINOLOGY | Age: 56
End: 2021-05-14
Payer: COMMERCIAL

## 2021-05-14 VITALS — DIASTOLIC BLOOD PRESSURE: 73 MMHG | SYSTOLIC BLOOD PRESSURE: 128 MMHG | BODY MASS INDEX: 26.9 KG/M2 | WEIGHT: 197 LBS

## 2021-05-14 DIAGNOSIS — E78.00 TYPE 1 DIABETES MELLITUS WITH HYPERCHOLESTEROLEMIA (HCC): ICD-10-CM

## 2021-05-14 DIAGNOSIS — E78.00 TYPE 1 DIABETES MELLITUS WITH HYPERCHOLESTEROLEMIA (HCC): Primary | ICD-10-CM

## 2021-05-14 DIAGNOSIS — E10.69 TYPE 1 DIABETES MELLITUS WITH HYPERCHOLESTEROLEMIA (HCC): ICD-10-CM

## 2021-05-14 DIAGNOSIS — E10.42 CONTROLLED TYPE 1 DIABETES MELLITUS WITH DIABETIC POLYNEUROPATHY (HCC): ICD-10-CM

## 2021-05-14 DIAGNOSIS — E10.69 TYPE 1 DIABETES MELLITUS WITH HYPERCHOLESTEROLEMIA (HCC): Primary | ICD-10-CM

## 2021-05-14 DIAGNOSIS — Z96.41 INSULIN PUMP IN PLACE: ICD-10-CM

## 2021-05-14 DIAGNOSIS — E10.42 TYPE 1 DIABETES MELLITUS WITH POLYNEUROPATHY (HCC): ICD-10-CM

## 2021-05-14 DIAGNOSIS — Z46.81 INSULIN PUMP TITRATION: ICD-10-CM

## 2021-05-14 LAB
CREATININE URINE: 174.9 MG/DL (ref 39–259)
MICROALBUMIN UR-MCNC: <1.2 MG/DL
MICROALBUMIN/CREAT UR-RTO: NORMAL MG/G (ref 0–30)

## 2021-05-14 PROCEDURE — 99214 OFFICE O/P EST MOD 30 MIN: CPT | Performed by: INTERNAL MEDICINE

## 2021-05-14 PROCEDURE — 95251 CONT GLUC MNTR ANALYSIS I&R: CPT | Performed by: INTERNAL MEDICINE

## 2021-05-14 PROCEDURE — 3017F COLORECTAL CA SCREEN DOC REV: CPT | Performed by: INTERNAL MEDICINE

## 2021-05-14 PROCEDURE — 2022F DILAT RTA XM EVC RTNOPTHY: CPT | Performed by: INTERNAL MEDICINE

## 2021-05-14 PROCEDURE — G8419 CALC BMI OUT NRM PARAM NOF/U: HCPCS | Performed by: INTERNAL MEDICINE

## 2021-05-14 PROCEDURE — 3044F HG A1C LEVEL LT 7.0%: CPT | Performed by: INTERNAL MEDICINE

## 2021-05-14 PROCEDURE — G8427 DOCREV CUR MEDS BY ELIG CLIN: HCPCS | Performed by: INTERNAL MEDICINE

## 2021-05-14 PROCEDURE — 1036F TOBACCO NON-USER: CPT | Performed by: INTERNAL MEDICINE

## 2021-05-14 NOTE — PROGRESS NOTES
Patient ID:   Angy Somers is a 60 y.o. male    Chief Complaint:   Angy Somers presents for an evaluation of Type 1 Diabetes Mellitus , Hyperlipidemia and hypertension. Subjective:   Type 1 Diabetes Mellitus diagnosed in 1968. Counts carbs, comfortable counting     Medtronic 670G   Uses sensor      Got a new pump and settings are off     Basal:  ( units per hour)   (active)   Mn:  0.90  6am: 0.925  6pm: 0.85    Correction  45  I:CHO MN: 1:15   Target     Active insulin time: 3.5 hours    He is on auto mode. He thinks it works better for hypoglycemias, takes longer time for hyperglycemia to correct. Checks blood sugars 8-10 times per day. Reviewed and scanned. AM:    Lunch:   Supper:    HS:      Hypoglycemias: Between MN to 7am, lowest 40. Hypoglycemic unawareness: mild awareness in 60's. Meals: Three, dinner is bigger. 0-3 snacks. Exercise: Elliptical bike, 4 times per week. Denies chest pain, exertional dyspnea. Family history of CAD: None   Denies smoking/ alcohol.    Currently on ASA 81 mg daily     The following portions of the patient's history were reviewed and updated as appropriate:       Family History   Problem Relation Age of Onset    Breast Cancer Mother     Diabetes Maternal Uncle         type 1    Cancer Paternal Grandmother     Cancer Paternal Grandfather          Social History     Socioeconomic History    Marital status: Single     Spouse name: Not on file    Number of children: Not on file    Years of education: Not on file    Highest education level: Not on file   Occupational History    Not on file   Social Needs    Financial resource strain: Not on file    Food insecurity     Worry: Not on file     Inability: Not on file    Transportation needs     Medical: Not on file     Non-medical: Not on file   Tobacco Use    Smoking status: Never Smoker    Smokeless tobacco: Never Used   Substance and Sexual Activity    Alcohol use: No    Drug use: No    Sexual activity: Not on file   Lifestyle    Physical activity     Days per week: Not on file     Minutes per session: Not on file    Stress: Not on file   Relationships    Social connections     Talks on phone: Not on file     Gets together: Not on file     Attends Cheondoism service: Not on file     Active member of club or organization: Not on file     Attends meetings of clubs or organizations: Not on file     Relationship status: Not on file    Intimate partner violence     Fear of current or ex partner: Not on file     Emotionally abused: Not on file     Physically abused: Not on file     Forced sexual activity: Not on file   Other Topics Concern    Not on file   Social History Narrative    Not on file       Past Medical History:   Diagnosis Date    Diabetes mellitus ([facility] [address]     Insulin pump in place        Past Surgical History:   Procedure Laterality Date    UPPER GASTROINTESTINAL ENDOSCOPY         No Known Allergies      Current Outpatient Medications:     insulin lispro (HUMALOG) 100 UNIT/ML injection vial, 70 units per day for insulin pump, Disp: 9 vial, Rfl: 3    blood glucose test strips (CONTOUR NEXT TEST) strip, 1 each by In Vitro route daily Test four times daily and PRN. , Disp: 200 each, Rfl: 2    blood glucose test strips (FREESTYLE LITE) strip, USE 5 STRIPS TO CHECK GLUCOSE ONCE DAILY AS NEEDED, Disp: 400 each, Rfl: 2    atorvastatin (LIPITOR) 40 MG tablet, Take 1 tablet by mouth daily, Disp: 90 tablet, Rfl: 3    aspirin 81 MG tablet, Take 1 tablet by mouth daily, Disp: 30 tablet, Rfl: 5    glucagon (GLUCAGON EMERGENCY) 1 MG injection, Infuse 1 mg intravenously once for 1 dose, Disp: 1 kit, Rfl: 11    Glucagon (BAQSIMI TWO PACK) 3 MG/DOSE POWD, Use for low blood sugars. , Disp: 1 each, Rfl: 5    Syringe/Needle, Disp, (SYRINGE 3CC/25GX5/8\") 25G X 5/8\" 3 ML MISC, Use in the event of pump failure, Disp: 100 each, Rfl: 5    Insulin Infusion Pump (MINIMED 670G INSULIN PUMP) [place], by Does not apply route, Disp: , Rfl:       Review of Systems:    Constitutional: Negative for fever, chills, and unexpected weight change. HENT: Negative for congestion, ear pain, rhinorrhea,  sore throat and trouble swallowing. Eyes: Negative for photophobia, redness, itching. Respiratory: Negative for cough, shortness of breath and sputum. Cardiovascular: Negative for chest pain, palpitations and leg swelling. Gastrointestinal: Negative for nausea, vomiting, abdominal pain, diarrhea, constipation. Endocrine: Negative for cold intolerance, heat intolerance, polydipsia, polyphagia and polyuria. Genitourinary: Negative for dysuria, urgency, frequency, hematuria and flank pain. Musculoskeletal: Negative for myalgias, back pain, arthralgias and neck pain. Skin/Nail: Negative for rash, itching. Normal nails. Neurological: Negative for seizures, weakness, light-headedness, numbness and headaches. Hematological/ Lymph nodes: Negative for adenopathy. Does not bruise/bleed easily. Psychiatric/Behavioral: Negative for suicidal ideas, depression, anxiety, sleep disturbance and decreased concentration. Objective:   Physical Exam:  /73 (Site: Left Upper Arm, Position: Sitting, Cuff Size: Large Adult)   Wt 197 lb (89.4 kg)   BMI 26.90 kg/m²   Constitutional: Patient is oriented to person, place, and time. Patient appears well-developed and well-nourished. HENT:    Head: Normocephalic and atraumatic. Eyes: Conjunctivae and EOM are normal.      Neck: Normal range of motion. Cardiovascular: Bradycardia, regular rhythm and normal heart sounds. Pulmonary/Chest: Effort normal and breath sounds normal.   Abdominal: Soft. Bowel sounds are normal.   Musculoskeletal: Normal range of motion. Neurological: Patient is alert and oriented to person, place, and time. Patient has normal reflexes. Skin: Skin is warm and dry. Psychiatric: Patient has a normal mood and affect.  Patient behavior is normal.     Lab Review:    Office Visit on 01/22/2021   Component Date Value Ref Range Status    Hemoglobin A1C 01/22/2021 5.98  % Final   Orders Only on 10/09/2020   Component Date Value Ref Range Status    Hemoglobin A1C 10/09/2020 5.9  See comment % Final    eAG 10/09/2020 122.6  mg/dL Final   Orders Only on 07/10/2020   Component Date Value Ref Range Status    Cholesterol, Fasting 07/10/2020 156  0 - 199 mg/dL Final    Triglyceride, Fasting 07/10/2020 52  0 - 150 mg/dL Final    HDL 07/10/2020 50  40 - 60 mg/dL Final    LDL Calculated 07/10/2020 96  <100 mg/dL Final    VLDL Cholesterol Calculated 07/10/2020 10  Not Established mg/dL Final    Hemoglobin A1C 07/10/2020 5.5  See comment % Final    eAG 07/10/2020 111.2  mg/dL Final           Assessment and Plan     Nathen Arceo was seen today for diabetes.     Diagnoses and all orders for this visit:    Controlled type 1 diabetes mellitus with diabetic polyneuropathy ([address]      1: Type 1 DM complicated with hypoglycemia, retinopathy and neuropathy   Controlled A1C 5.9% (POC) <  5.9% < 5.5% <  6% < 5.8 % < 5.5% < 6.2% << 6.6%    CMP normal     Personal CGMS data downloaded and reviewed   Average glucose: 112  Blood sugars: Above 180 - 1  % ,  - 78 %, below 70 -  21 %  Blood sugars are good in am, high in evening   During the day blood sugars are running well   Post meal hyperglycemia after carb diet   No activity related changes in blood sugars   Hypoglycemia: once only yesterday, lowest 66   Based on the data, change Lantus to 40 units, keep other meds the same       Please call before making any changes     Pump settings are aggressive contributing to 16% of hypoglycemias   Change settings as below     He may eventually to move to [facility] with omnipod to avoid tube entanglements     Rule of 15 for hypoglycemic episodes discussed   I showed how to use temporary basal settings     Keep changing pump site every 3-4 days   Pump downloads are reviewed, discussed with the patient and sent for scanning   Always use bolus wizards    He sometimes puts less carbs in the pump     Pump settings reviewed   Basal:  ( units per hour)   (active)   New settings ae   Mn:  0.850   Noon: 0.925  6pm: 0.850     Correction  45   I:CHO MN: 1: 15   Target     Active insulin time:  4.0 hours    He will call [employer] to get start auto mode       All instructions provided in written. Check Blood sugars 3-4 times per day. Log them along with insulin and send them every 2 weeks. Call for blood sugars less than 60 or more than 400. Eye exam: Jan 2021, denies DRSandra Foot exam: Nov 2019    Deformity/amputation: absent  Skin lesions/pre-ulcerative calluses: absent  Edemright- negative, left- negative    Sensory exam: Monofilament sensation: normal  Plus at least one of the following:   Pulses: 1 plus   Vibration (128 Hz): mildly reduced bilaterally     Renal screen: Normal Feb 2020      RD seen in 2017. TSH screen: normal with negative abs March 2018     2: HTN   Controlled     3: Hyperlipidemia   LDL: 96, HDL: 50, TGs: 52 July 2020     Atorvastatin 40mg daily at bedtime   He denies missing the dose     A1C, MACR today      RTC in 3 months or prn    Labs on the visit     EDUCATION:   Greater than 50% of this follow-up visit was spent in general counseling regarding   obesity, diet, exercise, importance of adherence to insulin regime, recognition and treatment of hypo and hyperglycemia,  glucose logging, proper diabetes management, diabetic complications with poor management and the importance of glycemic control in order to avoid the complications of diabetes. Risks and potential complications of diabetes were reviewed with the patient. Diabetes health maintenance plan and follow-up were discussed and understood by the patient. We reviewed the importance of medication compliance and regular follow-up. Aggressive lifestyle modification was encouraged. Exercise Counselling: This patient is a candidate for regular physical exercise. Instructions to perform the following types of exercise:  Swimming or water aerobic exercise  Brisk walking  Playing tennis  Stationary bicycle or elliptical indoor  Low impact aerobic exercise    Instructions given to exercise for the following duration:  30 minutes a day for five-seven days per week.     Following instructions for being active throughout the day in addition to formal exercise:  Walk instead of drive whenever possible  Take the stairs instead of the elevator  Work in the garden  Park to the far end of the parking lot to add more walking steps to destination      Electronically signed by Lizzie Lay MD on 5/14/2021 at 8:50 AM

## 2021-05-15 LAB
ESTIMATED AVERAGE GLUCOSE: 114 MG/DL
HBA1C MFR BLD: 5.6 %

## 2021-11-24 ENCOUNTER — TELEPHONE (OUTPATIENT)
Dept: ENDOCRINOLOGY | Age: 56
End: 2021-11-24

## 2021-11-24 NOTE — TELEPHONE ENCOUNTER
Left a detailed  message to contact the office for follow up ( per HIPAA ok to leave message )   Explained office received order form for supplies from [employer]. Order will be completed after follow up.